# Patient Record
Sex: MALE | Race: BLACK OR AFRICAN AMERICAN | Employment: STUDENT | URBAN - METROPOLITAN AREA
[De-identification: names, ages, dates, MRNs, and addresses within clinical notes are randomized per-mention and may not be internally consistent; named-entity substitution may affect disease eponyms.]

---
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
## 2023-02-15 PROBLEM — F31.60 BIPOLAR DISORDER, MIXED (MULTI): Status: ACTIVE | Noted: 2023-02-15

## 2023-02-15 PROBLEM — F25.9 SCHIZOAFFECTIVE DISORDER (MULTI): Status: ACTIVE | Noted: 2023-02-15

## 2023-02-15 PROBLEM — R74.01 ELEVATED AST (SGOT): Status: ACTIVE | Noted: 2023-02-15

## 2023-02-15 PROBLEM — F41.8 DEPRESSION WITH ANXIETY: Status: ACTIVE | Noted: 2023-02-15

## 2023-02-15 PROBLEM — T88.7XXA MEDICATION SIDE EFFECT: Status: ACTIVE | Noted: 2023-02-15

## 2023-02-15 PROBLEM — S39.012A ACUTE MYOFASCIAL STRAIN OF LUMBOSACRAL REGION: Status: ACTIVE | Noted: 2023-02-15

## 2023-02-15 PROBLEM — L83 ACANTHOSIS NIGRICANS: Status: ACTIVE | Noted: 2023-02-15

## 2023-02-15 PROBLEM — S39.012A STRAIN OF LUMBAR REGION: Status: ACTIVE | Noted: 2023-02-15

## 2023-02-15 PROBLEM — F32.1 CURRENT MODERATE EPISODE OF MAJOR DEPRESSIVE DISORDER WITHOUT PRIOR EPISODE (MULTI): Status: ACTIVE | Noted: 2023-02-15

## 2023-02-15 PROBLEM — J06.9 ACUTE URI: Status: ACTIVE | Noted: 2023-02-15

## 2023-02-15 PROBLEM — F90.9 ADHD: Status: ACTIVE | Noted: 2023-02-15

## 2023-02-15 RX ORDER — AMMONIUM LACTATE 12 G/100G
LOTION TOPICAL
COMMUNITY
Start: 2021-11-04

## 2023-02-15 RX ORDER — DEXTROAMPHETAMINE SACCHARATE, AMPHETAMINE ASPARTATE MONOHYDRATE, DEXTROAMPHETAMINE SULFATE, AMPHETAMINE SULFATE 6.25; 6.25; 6.25; 6.25 MG/1; MG/1; MG/1; MG/1
1 CAPSULE, EXTENDED RELEASE ORAL DAILY
COMMUNITY
Start: 2021-03-01 | End: 2023-03-14 | Stop reason: SDUPTHER

## 2023-02-15 RX ORDER — OLANZAPINE AND FLUOXETINE 6; 25 MG/1; MG/1
1 CAPSULE ORAL EVERY EVENING
COMMUNITY
Start: 2021-07-02 | End: 2023-03-14 | Stop reason: SDUPTHER

## 2023-03-14 DIAGNOSIS — F90.9 ATTENTION DEFICIT HYPERACTIVITY DISORDER (ADHD), UNSPECIFIED ADHD TYPE: Primary | ICD-10-CM

## 2023-03-14 DIAGNOSIS — F31.60 BIPOLAR DISORDER, MIXED (MULTI): ICD-10-CM

## 2023-03-14 RX ORDER — DEXTROAMPHETAMINE SACCHARATE, AMPHETAMINE ASPARTATE MONOHYDRATE, DEXTROAMPHETAMINE SULFATE, AMPHETAMINE SULFATE 6.25; 6.25; 6.25; 6.25 MG/1; MG/1; MG/1; MG/1
25 CAPSULE, EXTENDED RELEASE ORAL DAILY
Qty: 30 CAPSULE | Refills: 0 | Status: SHIPPED | OUTPATIENT
Start: 2023-03-14 | End: 2023-04-13 | Stop reason: SDUPTHER

## 2023-03-14 RX ORDER — OLANZAPINE AND FLUOXETINE 6; 25 MG/1; MG/1
1 CAPSULE ORAL EVERY EVENING
Qty: 30 CAPSULE | Refills: 1 | Status: SHIPPED | OUTPATIENT
Start: 2023-03-14 | End: 2023-05-15

## 2023-03-14 NOTE — TELEPHONE ENCOUNTER
Patient called in requesting refills on 2 medications to be sent to [facility] pharmacy on [address]  1) mydayis ; last fill 2/6  2) olanzapine fluoxetine 6-25 mg  Lov: 12/28  Future prior scheduled appt: 3/29  Please advise.

## 2023-03-29 ENCOUNTER — OFFICE VISIT (OUTPATIENT)
Dept: PRIMARY CARE | Facility: CLINIC | Age: 23
End: 2023-03-29
Payer: COMMERCIAL

## 2023-03-29 VITALS
HEART RATE: 80 BPM | WEIGHT: 254.6 LBS | DIASTOLIC BLOOD PRESSURE: 86 MMHG | HEIGHT: 67 IN | BODY MASS INDEX: 39.96 KG/M2 | TEMPERATURE: 32 F | RESPIRATION RATE: 16 BRPM | SYSTOLIC BLOOD PRESSURE: 126 MMHG | OXYGEN SATURATION: 94 %

## 2023-03-29 DIAGNOSIS — F90.9 ATTENTION DEFICIT HYPERACTIVITY DISORDER (ADHD), UNSPECIFIED ADHD TYPE: Primary | ICD-10-CM

## 2023-03-29 DIAGNOSIS — F31.60 BIPOLAR DISORDER, MIXED (MULTI): ICD-10-CM

## 2023-03-29 DIAGNOSIS — F41.8 DEPRESSION WITH ANXIETY: ICD-10-CM

## 2023-03-29 DIAGNOSIS — E66.09 CLASS 2 OBESITY DUE TO EXCESS CALORIES WITHOUT SERIOUS COMORBIDITY WITH BODY MASS INDEX (BMI) OF 39.0 TO 39.9 IN ADULT: ICD-10-CM

## 2023-03-29 DIAGNOSIS — F25.0 SCHIZOAFFECTIVE DISORDER, BIPOLAR TYPE (MULTI): ICD-10-CM

## 2023-03-29 PROBLEM — E66.812 CLASS 2 OBESITY DUE TO EXCESS CALORIES WITHOUT SERIOUS COMORBIDITY WITH BODY MASS INDEX (BMI) OF 39.0 TO 39.9 IN ADULT: Status: ACTIVE | Noted: 2023-03-29

## 2023-03-29 PROCEDURE — 99213 OFFICE O/P EST LOW 20 MIN: CPT | Performed by: FAMILY MEDICINE

## 2023-03-29 PROCEDURE — 3008F BODY MASS INDEX DOCD: CPT | Performed by: FAMILY MEDICINE

## 2023-03-29 PROCEDURE — 1036F TOBACCO NON-USER: CPT | Performed by: FAMILY MEDICINE

## 2023-03-29 ASSESSMENT — PATIENT HEALTH QUESTIONNAIRE - PHQ9
7. TROUBLE CONCENTRATING ON THINGS, SUCH AS READING THE NEWSPAPER OR WATCHING TELEVISION: SEVERAL DAYS
1. LITTLE INTEREST OR PLEASURE IN DOING THINGS: MORE THAN HALF THE DAYS
4. FEELING TIRED OR HAVING LITTLE ENERGY: SEVERAL DAYS
3. TROUBLE FALLING OR STAYING ASLEEP: NEARLY EVERY DAY
5. POOR APPETITE OR OVEREATING: NOT AT ALL
SUM OF ALL RESPONSES TO PHQ QUESTIONS 1-9: 13
8. MOVING OR SPEAKING SO SLOWLY THAT OTHER PEOPLE COULD HAVE NOTICED. OR THE OPPOSITE, BEING SO FIGETY OR RESTLESS THAT YOU HAVE BEEN MOVING AROUND A LOT MORE THAN USUAL: SEVERAL DAYS
9. THOUGHTS THAT YOU WOULD BE BETTER OFF DEAD, OR OF HURTING YOURSELF: SEVERAL DAYS
SUM OF ALL RESPONSES TO PHQ9 QUESTIONS 1 & 2: 4
6. FEELING BAD ABOUT YOURSELF - OR THAT YOU ARE A FAILURE OR HAVE LET YOURSELF OR YOUR FAMILY DOWN: MORE THAN HALF THE DAYS
2. FEELING DOWN, DEPRESSED OR HOPELESS: MORE THAN HALF THE DAYS
10. IF YOU CHECKED OFF ANY PROBLEMS, HOW DIFFICULT HAVE THESE PROBLEMS MADE IT FOR YOU TO DO YOUR WORK, TAKE CARE OF THINGS AT HOME, OR GET ALONG WITH OTHER PEOPLE: SOMEWHAT DIFFICULT

## 2023-03-29 ASSESSMENT — LIFESTYLE VARIABLES
SKIP TO QUESTIONS 9-10: 1
HOW OFTEN DO YOU HAVE A DRINK CONTAINING ALCOHOL: NEVER
HOW MANY STANDARD DRINKS CONTAINING ALCOHOL DO YOU HAVE ON A TYPICAL DAY: PATIENT DOES NOT DRINK
HOW OFTEN DO YOU HAVE SIX OR MORE DRINKS ON ONE OCCASION: NEVER
AUDIT-C TOTAL SCORE: 0

## 2023-03-29 ASSESSMENT — PAIN SCALES - GENERAL: PAINLEVEL: 0-NO PAIN

## 2023-03-29 NOTE — PATIENT INSTRUCTIONS
Patient to continue current medications (with any exceptions as noted) and diet. Follow-up in 3 month(s) otherwise as needed.

## 2023-03-29 NOTE — PROGRESS NOTES
"Subjective   Patient ID: Go Valdes II is a 22 y.o. male who presents for ADHD and bipolar disorder. I last saw the patient on 12/28/2022.     HPI   Patient states that everything has been okay since his last appointment.     Review of Systems  Except positives as noted in the CC & HPI      Constitutional: Denies fevers, chills, night sweats, fatigue, weight changes, change in appetite    Eyes: Denies blurry vision, double vision    ENT: Denies otalgia, trouble hearing, tinnitus, vertigo, nasal congestion, rhinorrhea, sore throat    Neck: Denies swelling, masses    Cardiovascular: Denies chest pain, palpitations, edema, orthopnea, syncope    Respiratory: Denies dyspnea, cough, wheezing, postural nocturnal dyspnea    Gastrointestinal: Denies abdominal pain, nausea, vomiting, diarrhea, constipation, melena, hematochezia    Genitourinary: Denies dysuria, hematuria, frequency, urgency    Musculoskeletal: Denies back pain, neck pain, arthralgias, myalgias    Integumentary: Denies skin lesions, rashes, masses    Neurological: Denies dizziness, headaches, confusion, limb weakness, paresthesias, syncope, convulsions    Psychiatric: Denies depression, anxiety, homicidal ideations, suicidal ideations, sleep disturbances    Endocrine: Denies polyphagia, polydipsia, polyuria, weakness, hair thinning, heat intolerance, cold intolerance, weight changes    Heme/Lymph: Denies easy bruising, easy bleeding, swollen glands     Objective   /86 (BP Location: Right arm, Patient Position: Sitting)   Pulse 80   Temp (!) 0 °C (32 °F) (Temporal)   Resp 16   Ht 1.702 m (5' 7\")   Wt 115 kg (254 lb 9.6 oz)   SpO2 94%   BMI 39.88 kg/m²     Physical Exam  126/86 on recheck of BP in the right arm.     General Appearance - well-developed, well-nourished, 22 y.o., Black male in no acute distress.     Skin - warm, pink and dry without rash or concerning lesions.     Mental Status - alert and oriented x 3. Normal mood and affect " appropriate to mood.     Neck - supple without lymphadenopathy. Carotid pulses are normal without bruits. Thyroid is normal in midline without nodules.     Chest - lungs are clear to auscultation without rales, rhonchi or wheezes.     Heart - regular, rate and rhythm without murmurs, rubs or gallops.    Abdomen - soft, obese, protuberant, nontender, nondistended. No masses, hepatomegaly or splenomegaly is noted. No rebound, rigidity or guarding is noted. Bowel sounds are normoactive.    Extremities - no cyanosis, clubbing or edema. Pedal pulses are 2+ normal at the dorsalis pedis and posterior pulses bilaterally.     Neurological - cranial nerves II through XII are grossly intact. Motor strength 5/5 at all fours.     Assessment/Plan   1. Attention deficit hyperactivity disorder (ADHD), unspecified ADHD type        2. Bipolar disorder, mixed (CMS/HCC)        3. Depression with anxiety        4. Schizoaffective disorder, bipolar type (CMS/HCC)        5. Class 2 obesity due to excess calories without serious comorbidity with body mass index (BMI) of 39.0 to 39.9 in adult        Patient to continue current medications (with any exceptions as noted) and diet. Follow-up in 3 month(s) otherwise as needed.      Encouraged patient to watch his diet and get more exercise into his routine.       Scribe Attestation  By signing my name below, ILaura Scribe   attest that this documentation has been prepared under the direction and in the presence of Medhat Owens MD.

## 2023-04-13 DIAGNOSIS — F90.9 ATTENTION DEFICIT HYPERACTIVITY DISORDER (ADHD), UNSPECIFIED ADHD TYPE: Primary | ICD-10-CM

## 2023-04-13 RX ORDER — DEXTROAMPHETAMINE SACCHARATE, AMPHETAMINE ASPARTATE MONOHYDRATE, DEXTROAMPHETAMINE SULFATE, AMPHETAMINE SULFATE 6.25; 6.25; 6.25; 6.25 MG/1; MG/1; MG/1; MG/1
25 CAPSULE, EXTENDED RELEASE ORAL DAILY
Qty: 30 CAPSULE | Refills: 0 | Status: SHIPPED | OUTPATIENT
Start: 2023-04-13 | End: 2023-05-18 | Stop reason: SDUPTHER

## 2023-04-13 NOTE — TELEPHONE ENCOUNTER
Rx Refill Request Telephone Encounter    Name:  Go Valdes II  :  [id number]  Medication Name:  Mydayis 25 mg  Specific Pharmacy location:  [facility]  Date of last appointment:  3/29  Date of next appointment:    Best number to reach patient:  [phone]

## 2023-05-13 DIAGNOSIS — F31.60 BIPOLAR DISORDER, MIXED (MULTI): Primary | ICD-10-CM

## 2023-05-15 RX ORDER — OLANZAPINE AND FLUOXETINE 6; 25 MG/1; MG/1
1 CAPSULE ORAL EVERY EVENING
Qty: 30 CAPSULE | Refills: 1 | Status: SHIPPED | OUTPATIENT
Start: 2023-05-15 | End: 2023-06-16 | Stop reason: SDUPTHER

## 2023-05-18 DIAGNOSIS — F90.9 ATTENTION DEFICIT HYPERACTIVITY DISORDER (ADHD), UNSPECIFIED ADHD TYPE: ICD-10-CM

## 2023-05-18 RX ORDER — DEXTROAMPHETAMINE SACCHARATE, AMPHETAMINE ASPARTATE MONOHYDRATE, DEXTROAMPHETAMINE SULFATE, AMPHETAMINE SULFATE 6.25; 6.25; 6.25; 6.25 MG/1; MG/1; MG/1; MG/1
25 CAPSULE, EXTENDED RELEASE ORAL DAILY
Qty: 30 CAPSULE | Refills: 0 | Status: SHIPPED | OUTPATIENT
Start: 2023-05-18 | End: 2023-06-16 | Stop reason: SDUPTHER

## 2023-05-18 NOTE — TELEPHONE ENCOUNTER
Rx Refill Request Telephone Encounter    Name:  Go aVldes II  :  [id number]  Medication Name:  amphetamine-dextroamphetamine XR (Mydayis) 25 mg   Specific Pharmacy location:  [facility] [address]  Date of last appointment:  3/29  Date of next appointment:    Best number to reach patient:  [phone]

## 2023-06-16 DIAGNOSIS — F31.60 BIPOLAR DISORDER, MIXED (MULTI): ICD-10-CM

## 2023-06-16 DIAGNOSIS — F90.9 ATTENTION DEFICIT HYPERACTIVITY DISORDER (ADHD), UNSPECIFIED ADHD TYPE: ICD-10-CM

## 2023-06-16 RX ORDER — OLANZAPINE AND FLUOXETINE 6; 25 MG/1; MG/1
1 CAPSULE ORAL EVERY EVENING
Qty: 30 CAPSULE | Refills: 1 | Status: SHIPPED | OUTPATIENT
Start: 2023-06-16 | End: 2023-07-18 | Stop reason: SDUPTHER

## 2023-06-16 RX ORDER — DEXTROAMPHETAMINE SACCHARATE, AMPHETAMINE ASPARTATE MONOHYDRATE, DEXTROAMPHETAMINE SULFATE, AMPHETAMINE SULFATE 6.25; 6.25; 6.25; 6.25 MG/1; MG/1; MG/1; MG/1
25 CAPSULE, EXTENDED RELEASE ORAL DAILY
Qty: 30 CAPSULE | Refills: 0 | Status: SHIPPED | OUTPATIENT
Start: 2023-06-16 | End: 2023-07-18 | Stop reason: SDUPTHER

## 2023-06-16 NOTE — TELEPHONE ENCOUNTER
Rx Refill Request Telephone Encounter    Name:  Go Valdes II  :  [id number]  Medication Name:  OLANZAPINE-FLUOXETINE 6-25 MG            Specific Pharmacy location:  [facility]  Date of last appointment:  3/29/23  Date of next appointment:  23  Best number to reach patient:  [phone]      Name:  Go Valdes II  :  [id number]  Medication Name:  MYDAYIS 25 MG            Specific Pharmacy location:  [facility]  Date of last appointment:  3/29/23  Date of next appointment:  23  Best number to reach patient:  [phone]

## 2023-06-27 NOTE — PROGRESS NOTES
Subjective   Patient ID: Go Valdes II is a 23 y.o. male who presents for Depression, Anxiety, Schizoaffective Disorder, Bipolar, and Follow-up.  I last saw the patient on 3/29/2023.     HPI   Patient states that he would like an increase on his Mydayis. He states that it loses its effect as the say goes on. Patient states that you have previously discussed him taking one in the morning and one later on in the day and patient would like to try this.     He notes that there was a plateau with two peaks effect with the Adderall. With Mydayis, there is a plateau with a slowly lessening effect. He prefers the Mydayis.     He mentions that he has been staying consistent and exercising.     Review of Systems  Except positives as noted in the CC & HPI      Constitutional: Denies fevers, chills, night sweats, fatigue, weight changes, change in appetite    Eyes: Denies blurry vision, double vision    ENT: Denies otalgia, trouble hearing, tinnitus, vertigo, nasal congestion, rhinorrhea, sore throat    Neck: Denies swelling, masses    Cardiovascular: Denies chest pain, palpitations, edema, orthopnea, syncope    Respiratory: Denies dyspnea, cough, wheezing, postural nocturnal dyspnea    Gastrointestinal: Denies abdominal pain, nausea, vomiting, diarrhea, constipation, melena, hematochezia    Genitourinary: Denies dysuria, hematuria, frequency, urgency    Musculoskeletal: Denies back pain, neck pain, arthralgias, myalgias    Integumentary: Denies skin lesions, rashes, masses    Neurological: Denies dizziness, headaches, confusion, limb weakness, paresthesias, syncope, convulsions    Psychiatric: Denies depression, anxiety, homicidal ideations, suicidal ideations, sleep disturbances    Endocrine: Denies polyphagia, polydipsia, polyuria, weakness, hair thinning, heat intolerance, cold intolerance, weight changes    Heme/Lymph: Denies easy bruising, easy bleeding, swollen glands     Objective   /74 (BP Location: Right arm,  "Patient Position: Sitting)   Pulse 88   Temp 36 °C (96.8 °F)   Resp 16   Ht 1.702 m (5' 7\")   Wt 116 kg (255 lb 6.4 oz)   SpO2 96%   BMI 40.00 kg/m²     Physical Exam  108/74 on recheck of BP in the right arm.     General Appearance - well-developed, well-nourished, 23 y.o., Black male in no acute distress.     Skin - warm, pink and dry without rash or concerning lesions.     Mental Status - alert and oriented x 3. Normal mood and affect appropriate to mood.     Neck - supple without lymphadenopathy. Carotid pulses are normal without bruits. Thyroid is normal in midline without nodules.     Chest - lungs are clear to auscultation without rales, rhonchi or wheezes.     Heart - regular, rate and rhythm without murmurs, rubs or gallops.    Extremities - no cyanosis, clubbing or edema. Pedal pulses are 2+ normal at the dorsalis pedis and posterior pulses bilaterally.     Neurological - cranial nerves II through XII are grossly intact. Motor strength 5/5 at all fours.     Assessment/Plan   1. Attention deficit hyperactivity disorder (ADHD), unspecified ADHD type        2. Bipolar disorder, mixed (CMS/HCC)        3. Depression with anxiety        4. Class 3 severe obesity due to excess calories without serious comorbidity with body mass index (BMI) of 40.0 to 44.9 in adult (CMS/HCC)        Patient to continue current medications (with any exceptions as noted) and diet. Follow-up in 3 month(s) otherwise as needed.      Consider increasing Mydayis dosage to 37.5 mg at his next visit.     Encouraged patient to continue watching his diet and exercise routine.         Scribe Attestation  By signing my name below, ILaura Scribe   attest that this documentation has been prepared under the direction and in the presence of Medhat Owens MD.   "

## 2023-06-28 ENCOUNTER — OFFICE VISIT (OUTPATIENT)
Dept: PRIMARY CARE | Facility: CLINIC | Age: 23
End: 2023-06-28
Payer: COMMERCIAL

## 2023-06-28 VITALS
OXYGEN SATURATION: 96 % | RESPIRATION RATE: 16 BRPM | HEART RATE: 88 BPM | HEIGHT: 67 IN | SYSTOLIC BLOOD PRESSURE: 108 MMHG | TEMPERATURE: 96.8 F | WEIGHT: 255.4 LBS | BODY MASS INDEX: 40.09 KG/M2 | DIASTOLIC BLOOD PRESSURE: 74 MMHG

## 2023-06-28 DIAGNOSIS — F41.8 DEPRESSION WITH ANXIETY: ICD-10-CM

## 2023-06-28 DIAGNOSIS — F31.60 BIPOLAR DISORDER, MIXED (MULTI): ICD-10-CM

## 2023-06-28 DIAGNOSIS — E66.01 CLASS 3 SEVERE OBESITY DUE TO EXCESS CALORIES WITHOUT SERIOUS COMORBIDITY WITH BODY MASS INDEX (BMI) OF 40.0 TO 44.9 IN ADULT (MULTI): ICD-10-CM

## 2023-06-28 DIAGNOSIS — F90.9 ATTENTION DEFICIT HYPERACTIVITY DISORDER (ADHD), UNSPECIFIED ADHD TYPE: Primary | ICD-10-CM

## 2023-06-28 PROBLEM — J06.9 ACUTE URI: Status: RESOLVED | Noted: 2023-02-15 | Resolved: 2023-06-28

## 2023-06-28 PROBLEM — E66.813 CLASS 3 SEVERE OBESITY DUE TO EXCESS CALORIES WITHOUT SERIOUS COMORBIDITY WITH BODY MASS INDEX (BMI) OF 40.0 TO 44.9 IN ADULT: Status: ACTIVE | Noted: 2023-03-29

## 2023-06-28 PROCEDURE — 99213 OFFICE O/P EST LOW 20 MIN: CPT | Performed by: FAMILY MEDICINE

## 2023-06-28 PROCEDURE — 1036F TOBACCO NON-USER: CPT | Performed by: FAMILY MEDICINE

## 2023-06-28 PROCEDURE — 3008F BODY MASS INDEX DOCD: CPT | Performed by: FAMILY MEDICINE

## 2023-06-28 ASSESSMENT — LIFESTYLE VARIABLES
HOW OFTEN DO YOU HAVE A DRINK CONTAINING ALCOHOL: NEVER
SKIP TO QUESTIONS 9-10: 1
HOW OFTEN DO YOU HAVE SIX OR MORE DRINKS ON ONE OCCASION: NEVER
HOW MANY STANDARD DRINKS CONTAINING ALCOHOL DO YOU HAVE ON A TYPICAL DAY: PATIENT DOES NOT DRINK
AUDIT-C TOTAL SCORE: 0

## 2023-06-28 ASSESSMENT — PATIENT HEALTH QUESTIONNAIRE - PHQ9
SUM OF ALL RESPONSES TO PHQ9 QUESTIONS 1 & 2: 2
1. LITTLE INTEREST OR PLEASURE IN DOING THINGS: SEVERAL DAYS
2. FEELING DOWN, DEPRESSED OR HOPELESS: SEVERAL DAYS
10. IF YOU CHECKED OFF ANY PROBLEMS, HOW DIFFICULT HAVE THESE PROBLEMS MADE IT FOR YOU TO DO YOUR WORK, TAKE CARE OF THINGS AT HOME, OR GET ALONG WITH OTHER PEOPLE: SOMEWHAT DIFFICULT

## 2023-06-28 NOTE — PATIENT INSTRUCTIONS
Patient to continue current medications (with any exceptions as noted) and diet. Follow-up in 3 month(s) otherwise as needed.      Consider increasing Mydayis dosage at his next visit.     Encouraged patient to continue watching his diet and exercise routine.

## 2023-07-18 DIAGNOSIS — F90.9 ATTENTION DEFICIT HYPERACTIVITY DISORDER (ADHD), UNSPECIFIED ADHD TYPE: ICD-10-CM

## 2023-07-18 DIAGNOSIS — F31.60 BIPOLAR DISORDER, MIXED (MULTI): Primary | ICD-10-CM

## 2023-07-18 RX ORDER — OLANZAPINE AND FLUOXETINE 6; 25 MG/1; MG/1
1 CAPSULE ORAL EVERY EVENING
Qty: 30 CAPSULE | Refills: 1 | Status: SHIPPED | OUTPATIENT
Start: 2023-07-18 | End: 2023-09-21 | Stop reason: SDUPTHER

## 2023-07-18 RX ORDER — DEXTROAMPHETAMINE SACCHARATE, AMPHETAMINE ASPARTATE MONOHYDRATE, DEXTROAMPHETAMINE SULFATE, AMPHETAMINE SULFATE 6.25; 6.25; 6.25; 6.25 MG/1; MG/1; MG/1; MG/1
25 CAPSULE, EXTENDED RELEASE ORAL DAILY
Qty: 30 CAPSULE | Refills: 0 | Status: SHIPPED | OUTPATIENT
Start: 2023-07-18 | End: 2023-08-25 | Stop reason: SDUPTHER

## 2023-07-18 NOTE — TELEPHONE ENCOUNTER
Rx Refill Request Telephone Encounter    Name:  Go Valdes II  :  [id number]  Medication Name:    olanzapine-fluoxetine (Symbyax) 6-25 mg   amphetamine-dextroamphetamine XR (Mydayis) 25 mg **  Specific Pharmacy location:  [facility]  Date of last appointment:    Date of next appointment:    Best number to reach patient:  [phone]       ** patient noted the dosage of the mydayis was supposed to be increased after his appointment on

## 2023-07-19 DIAGNOSIS — F31.60 BIPOLAR DISORDER, MIXED (MULTI): ICD-10-CM

## 2023-07-21 DIAGNOSIS — F31.60 BIPOLAR DISORDER, MIXED (MULTI): ICD-10-CM

## 2023-07-24 RX ORDER — OLANZAPINE AND FLUOXETINE 6; 25 MG/1; MG/1
1 CAPSULE ORAL EVERY EVENING
Qty: 30 CAPSULE | Refills: 1 | OUTPATIENT
Start: 2023-07-24

## 2023-07-24 NOTE — TELEPHONE ENCOUNTER
We received a request for prior authorization on the patient's SYMBYAX from their pharmacy. Prior authorization was submitted to insurance today. We will await their determination.

## 2023-07-25 DIAGNOSIS — F31.60 BIPOLAR DISORDER, MIXED (MULTI): ICD-10-CM

## 2023-07-28 RX ORDER — OLANZAPINE AND FLUOXETINE 6; 25 MG/1; MG/1
1 CAPSULE ORAL EVERY EVENING
Qty: 30 CAPSULE | Refills: 1 | OUTPATIENT
Start: 2023-07-28

## 2023-08-25 DIAGNOSIS — F90.9 ATTENTION DEFICIT HYPERACTIVITY DISORDER (ADHD), UNSPECIFIED ADHD TYPE: ICD-10-CM

## 2023-08-25 RX ORDER — DEXTROAMPHETAMINE SACCHARATE, AMPHETAMINE ASPARTATE MONOHYDRATE, DEXTROAMPHETAMINE SULFATE, AMPHETAMINE SULFATE 9.375; 9.375; 9.375; 9.375 MG/1; MG/1; MG/1; MG/1
37.5 CAPSULE, EXTENDED RELEASE ORAL DAILY
Qty: 30 CAPSULE | Refills: 0 | Status: SHIPPED | OUTPATIENT
Start: 2023-08-25 | End: 2023-10-09 | Stop reason: SDUPTHER

## 2023-08-25 RX ORDER — DEXTROAMPHETAMINE SACCHARATE, AMPHETAMINE ASPARTATE MONOHYDRATE, DEXTROAMPHETAMINE SULFATE, AMPHETAMINE SULFATE 6.25; 6.25; 6.25; 6.25 MG/1; MG/1; MG/1; MG/1
25 CAPSULE, EXTENDED RELEASE ORAL DAILY
Qty: 30 CAPSULE | Refills: 0 | Status: SHIPPED | OUTPATIENT
Start: 2023-08-25 | End: 2023-08-25 | Stop reason: SDUPTHER

## 2023-08-25 NOTE — TELEPHONE ENCOUNTER
Rx Refill Request Telephone Encounter    Name:  Go Valdes II  :  [id number]  Medication Name:  mydayis - patient stated he was supposed to have a dose increase   Specific Pharmacy location:  [facility] [address]    Date of last appointment:  23  Date of next appointment:  23  Best number to reach patient:  [phone]

## 2023-08-29 RX ORDER — DEXTROAMPHETAMINE SACCHARATE, AMPHETAMINE ASPARTATE MONOHYDRATE, DEXTROAMPHETAMINE SULFATE, AMPHETAMINE SULFATE 9.375; 9.375; 9.375; 9.375 MG/1; MG/1; MG/1; MG/1
37.5 CAPSULE, EXTENDED RELEASE ORAL DAILY
Qty: 30 CAPSULE | Refills: 0 | OUTPATIENT
Start: 2023-08-29 | End: 2023-09-28

## 2023-08-29 NOTE — TELEPHONE ENCOUNTER
Rx Refill Request Telephone Encounter    Name:  Go Valdes II  :  [id number]  Medication Name:  Mydayis 37.5mg 24hr capsule  Specific Pharmacy location:  [facility] [id number]   Date of last appointment:  23  Date of next appointment:  23  Best number to reach patient:  [phone]

## 2023-09-01 ENCOUNTER — TELEPHONE (OUTPATIENT)
Dept: PRIMARY CARE | Facility: CLINIC | Age: 23
End: 2023-09-01
Payer: COMMERCIAL

## 2023-09-01 NOTE — TELEPHONE ENCOUNTER
Patient called in stating he is needing a PA on his prescription for mydayis. He is asking this be started ASAP  Please Advise

## 2023-09-21 DIAGNOSIS — F31.60 BIPOLAR DISORDER, MIXED (MULTI): ICD-10-CM

## 2023-09-21 RX ORDER — OLANZAPINE AND FLUOXETINE 6; 25 MG/1; MG/1
1 CAPSULE ORAL EVERY EVENING
Qty: 30 CAPSULE | Refills: 0 | Status: SHIPPED | OUTPATIENT
Start: 2023-09-21 | End: 2023-10-23 | Stop reason: SDUPTHER

## 2023-09-21 NOTE — TELEPHONE ENCOUNTER
Rx Refill Request Telephone Encounter    Name:  Go Valdes II  :  [id number]  Medication Name:  symbyax 6-25 mg    Specific Pharmacy location:  [facility] [address]  Date of last appointment:  23  Date of next appointment:  23  Best number to reach patient:  [phone]

## 2023-09-27 ENCOUNTER — OFFICE VISIT (OUTPATIENT)
Dept: PRIMARY CARE | Facility: CLINIC | Age: 23
End: 2023-09-27
Payer: COMMERCIAL

## 2023-09-27 VITALS
TEMPERATURE: 97.2 F | RESPIRATION RATE: 16 BRPM | DIASTOLIC BLOOD PRESSURE: 88 MMHG | HEIGHT: 67 IN | WEIGHT: 250 LBS | HEART RATE: 75 BPM | BODY MASS INDEX: 39.24 KG/M2 | SYSTOLIC BLOOD PRESSURE: 118 MMHG | OXYGEN SATURATION: 100 %

## 2023-09-27 DIAGNOSIS — Z51.81 ENCOUNTER FOR THERAPEUTIC DRUG LEVEL MONITORING: ICD-10-CM

## 2023-09-27 DIAGNOSIS — F31.60 BIPOLAR DISORDER, MIXED (MULTI): ICD-10-CM

## 2023-09-27 DIAGNOSIS — F41.8 DEPRESSION WITH ANXIETY: ICD-10-CM

## 2023-09-27 DIAGNOSIS — E66.09 CLASS 2 OBESITY DUE TO EXCESS CALORIES WITHOUT SERIOUS COMORBIDITY WITH BODY MASS INDEX (BMI) OF 39.0 TO 39.9 IN ADULT: ICD-10-CM

## 2023-09-27 DIAGNOSIS — F90.9 ATTENTION DEFICIT HYPERACTIVITY DISORDER (ADHD), UNSPECIFIED ADHD TYPE: Primary | ICD-10-CM

## 2023-09-27 DIAGNOSIS — Z13.220 LIPID SCREENING: ICD-10-CM

## 2023-09-27 PROCEDURE — 99213 OFFICE O/P EST LOW 20 MIN: CPT | Performed by: FAMILY MEDICINE

## 2023-09-27 PROCEDURE — 3008F BODY MASS INDEX DOCD: CPT | Performed by: FAMILY MEDICINE

## 2023-09-27 PROCEDURE — 80307 DRUG TEST PRSMV CHEM ANLYZR: CPT

## 2023-09-27 PROCEDURE — 1036F TOBACCO NON-USER: CPT | Performed by: FAMILY MEDICINE

## 2023-09-27 ASSESSMENT — PATIENT HEALTH QUESTIONNAIRE - PHQ9
1. LITTLE INTEREST OR PLEASURE IN DOING THINGS: SEVERAL DAYS
10. IF YOU CHECKED OFF ANY PROBLEMS, HOW DIFFICULT HAVE THESE PROBLEMS MADE IT FOR YOU TO DO YOUR WORK, TAKE CARE OF THINGS AT HOME, OR GET ALONG WITH OTHER PEOPLE: VERY DIFFICULT
SUM OF ALL RESPONSES TO PHQ9 QUESTIONS 1 & 2: 2
2. FEELING DOWN, DEPRESSED OR HOPELESS: SEVERAL DAYS

## 2023-09-27 NOTE — PATIENT INSTRUCTIONS
Patient to continue current medications (with any exceptions as noted) and diet. Follow-up in 3 month(s) otherwise as needed.      Patient is to return for fasting CBC, CMP, lipid panel labs at their convenience prior to his next appointment. Fasting is nothing to eat or drink except water or black coffee for 8-12 hours. Will call patient with results when available.     Encouraged patient to continue with exercise and losing weight.     Patient declines flu vaccine.

## 2023-09-27 NOTE — PROGRESS NOTES
"Subjective   Patient ID: Go Valdes II is a 23 y.o. male who presents for Follow-up, ADHD, and Depression. I last saw the patient on 6/28/2023.     HPI   Patient has no medical complaints today. Patient notes he is doing better with the increased dose of Mydayis.     He mentions that he has started going back to the gym.     Review of Systems  Except positives as noted in the CC & HPI      Constitutional: Denies fevers, chills, night sweats, fatigue, weight changes, change in appetite    Eyes: Denies blurry vision, double vision    ENT: Denies otalgia, trouble hearing, tinnitus, vertigo, nasal congestion, rhinorrhea, sore throat    Neck: Denies swelling, masses    Cardiovascular: Denies chest pain, palpitations, edema, orthopnea, syncope    Respiratory: Denies dyspnea, cough, wheezing, postural nocturnal dyspnea    Gastrointestinal: Denies abdominal pain, nausea, vomiting, diarrhea, constipation, melena, hematochezia    Genitourinary: Denies dysuria, hematuria, frequency, urgency    Musculoskeletal: Denies back pain, neck pain, arthralgias, myalgias    Integumentary: Denies skin lesions, rashes, masses    Neurological: Denies dizziness, headaches, confusion, limb weakness, paresthesias, syncope, convulsions    Psychiatric: Denies depression, anxiety, homicidal ideations, suicidal ideations, sleep disturbances    Endocrine: Denies polyphagia, polydipsia, polyuria, weakness, hair thinning, heat intolerance, cold intolerance, weight changes    Heme/Lymph: Denies easy bruising, easy bleeding, swollen glands    Objective   /88 (BP Location: Right arm, Patient Position: Sitting)   Pulse 75   Temp 36.2 °C (97.2 °F)   Resp 16   Ht 1.702 m (5' 7\")   Wt 113 kg (250 lb)   SpO2 100%   BMI 39.16 kg/m²     Physical Exam  118/88 on recheck of BP in the right arm.     General Appearance - well-developed, well-nourished, 23 y.o., Black male in no acute distress.     Skin - warm, pink and dry without rash or concerning " lesions.     Mental Status - alert and oriented x 3. Normal mood and affect appropriate to mood.     Neck - supple without lymphadenopathy. Carotid pulses are normal without bruits. Thyroid is normal in midline without nodules.     Chest - lungs are clear to auscultation without rales, rhonchi or wheezes.     Heart - regular, rate and rhythm without murmurs, rubs or gallops.    Extremities - no cyanosis, clubbing or edema. Pedal pulses are 2+ normal at the dorsalis pedis and posterior pulses bilaterally.     Neurological - cranial nerves II through XII are grossly intact. Motor strength 5/5 at all fours.     Assessment/Plan   1. Attention deficit hyperactivity disorder (ADHD), unspecified ADHD type  Follow Up In [facility] - PCP - Established    Follow Up In [facility] - PCP - Established      2. Bipolar disorder, mixed (CMS/HCC)  Follow Up In [facility] - PCP - Established    Follow Up In [facility] - PCP - Established      3. Depression with anxiety  Follow Up In [facility] - PCP - Established    Follow Up In [facility] - PCP - Established      4. Class 2 obesity due to excess calories without serious comorbidity with body mass index (BMI) of 39.0 to 39.9 in adult  Follow Up In [facility] - PCP - Established    CBC and Auto Differential    Comprehensive Metabolic Panel      5. Lipid screening  Lipid Panel      6. Encounter for therapeutic drug level monitoring  Drug Screen, Urine With Reflex to Confirmation    Drug Screen, Urine With Reflex to Confirmation      Patient to continue current medications (with any exceptions as noted) and diet. Follow-up in 3 month(s) otherwise as needed.      Patient is to return for fasting CBC, CMP, lipid panel labs at their convenience prior to his next appointment. Fasting is nothing to eat or drink except water or black coffee for 8-12 hours. Will call patient with results when available.     Encouraged  patient to continue with exercise and losing weight.     Patient declines flu vaccine.     Patient signed medication contract and UDS will be obtained today.     I personally have reviewed the OARRS report for this patient. This report is scanned into the electronic medical record. I have considered the risks of abuse, dependence, addiction and diversion. I believe that it is clinically appropriate for the patient to be prescribed this medication.       Scribe Attestation  By signing my name below, I, Bobbi Aguirre   attest that this documentation has been prepared under the direction and in the presence of Medhat Owens MD.

## 2023-09-28 LAB
AMPHETAMINE (PRESENCE) IN URINE BY SCREEN METHOD: ABNORMAL
BARBITURATES PRESENCE IN URINE BY SCREEN METHOD: ABNORMAL
BENZODIAZEPINE (PRESENCE) IN URINE BY SCREEN METHOD: ABNORMAL
CANNABINOIDS IN URINE BY SCREEN METHOD: ABNORMAL
COCAINE (PRESENCE) IN URINE BY SCREEN METHOD: ABNORMAL
DRUG SCREEN COMMENT URINE: ABNORMAL
FENTANYL URINE: ABNORMAL
OPIATES (PRESENCE) IN URINE BY SCREEN METHOD: ABNORMAL
OXYCODONE (PRESENCE) IN URINE BY SCREEN METHOD: ABNORMAL
PHENCYCLIDINE (PRESENCE) IN URINE BY SCREEN METHOD: ABNORMAL

## 2023-10-09 DIAGNOSIS — F90.9 ATTENTION DEFICIT HYPERACTIVITY DISORDER (ADHD), UNSPECIFIED ADHD TYPE: ICD-10-CM

## 2023-10-09 RX ORDER — DEXTROAMPHETAMINE SACCHARATE, AMPHETAMINE ASPARTATE MONOHYDRATE, DEXTROAMPHETAMINE SULFATE, AMPHETAMINE SULFATE 9.375; 9.375; 9.375; 9.375 MG/1; MG/1; MG/1; MG/1
37.5 CAPSULE, EXTENDED RELEASE ORAL DAILY
Qty: 30 CAPSULE | Refills: 0 | Status: SHIPPED | OUTPATIENT
Start: 2023-10-09 | End: 2023-11-13 | Stop reason: SDUPTHER

## 2023-10-09 NOTE — TELEPHONE ENCOUNTER
Rx Refill Request Telephone Encounter    Name:  Go Valdes II  :  [id number]  Medication Name:  mydayis 37.5    Specific Pharmacy location:  [facility] #[id number]  Date of last appointment:  23  Date of next appointment:  23  Best number to reach patient:  [phone]

## 2023-10-23 DIAGNOSIS — F31.60 BIPOLAR DISORDER, MIXED (MULTI): Primary | ICD-10-CM

## 2023-10-23 DIAGNOSIS — F31.60 BIPOLAR DISORDER, MIXED (MULTI): ICD-10-CM

## 2023-10-23 RX ORDER — OLANZAPINE AND FLUOXETINE 6; 25 MG/1; MG/1
1 CAPSULE ORAL EVERY EVENING
Qty: 30 CAPSULE | Refills: 0 | OUTPATIENT
Start: 2023-10-23

## 2023-10-23 RX ORDER — OLANZAPINE AND FLUOXETINE 6; 25 MG/1; MG/1
1 CAPSULE ORAL EVERY EVENING
Qty: 30 CAPSULE | Refills: 2 | Status: SHIPPED | OUTPATIENT
Start: 2023-10-23 | End: 2023-11-20 | Stop reason: SDUPTHER

## 2023-10-23 NOTE — TELEPHONE ENCOUNTER
Rx Refill Request Telephone Encounter    Name:  Go Valdes II  :  [id number]  Medication Name:  symbyax 6-25mg    Specific Pharmacy location:  [facility] [address]  Date of last appointment:  23  Date of next appointment:  23  Best number to reach patient:  [phone]

## 2023-10-24 LAB
AMPHETAMINES,URINE: NORMAL
MDA,URINE: NORMAL
MDEA,URINE: NORMAL
MDMA,UR: NORMAL
METHAMPHETAMINE QUANTITATIVE URINE: NORMAL
PHENTERMINE,UR: NORMAL

## 2023-11-13 DIAGNOSIS — F90.9 ATTENTION DEFICIT HYPERACTIVITY DISORDER (ADHD), UNSPECIFIED ADHD TYPE: Primary | ICD-10-CM

## 2023-11-13 RX ORDER — DEXTROAMPHETAMINE SACCHARATE, AMPHETAMINE ASPARTATE MONOHYDRATE, DEXTROAMPHETAMINE SULFATE, AMPHETAMINE SULFATE 9.375; 9.375; 9.375; 9.375 MG/1; MG/1; MG/1; MG/1
37.5 CAPSULE, EXTENDED RELEASE ORAL DAILY
Qty: 30 CAPSULE | Refills: 0 | Status: SHIPPED | OUTPATIENT
Start: 2023-11-13 | End: 2023-12-20 | Stop reason: SDUPTHER

## 2023-11-13 NOTE — TELEPHONE ENCOUNTER
Rx Refill Request Telephone Encounter    Name:  Go Valdes II  :  [id number]  Medication Name:  mydayis 37.5mg    Specific Pharmacy location:  [facility] [address]  Date of last appointment:  23  Date of next appointment:  23  Best number to reach patient:  [phone]

## 2023-11-20 DIAGNOSIS — F31.60 BIPOLAR DISORDER, MIXED (MULTI): ICD-10-CM

## 2023-11-20 RX ORDER — OLANZAPINE AND FLUOXETINE 6; 25 MG/1; MG/1
1 CAPSULE ORAL EVERY EVENING
Qty: 30 CAPSULE | Refills: 2 | Status: SHIPPED | OUTPATIENT
Start: 2023-11-20 | End: 2024-01-18 | Stop reason: SDUPTHER

## 2023-11-20 NOTE — TELEPHONE ENCOUNTER
Rx Refill Request Telephone Encounter    Name:  Go Valdes II  :  [id number]  Medication Name:  OLANZAPINE - FLUOXETINE 6-25MG   Specific Pharmacy location:  [facility] [address]   Date of last appointment: 23   Date of next appointment:  23  Best number to reach patient:  [phone]

## 2023-12-19 NOTE — PROGRESS NOTES
"Subjective   Patient ID: Go Valdes II is a 23 y.o. male who presents for ADHD, Depression, and Obesity.  I last saw the patient on 9/27/2023.     HPI   Patient has no medical complaints today or refill requests.     Symbyax has been working well for him, emotionally.     Review of Systems  Except positives as noted in the CC & HPI      Constitutional: Denies fevers, chills, night sweats, fatigue, weight changes, change in appetite    Eyes: Denies blurry vision, double vision    ENT: Denies otalgia, trouble hearing, tinnitus, vertigo, nasal congestion, rhinorrhea, sore throat    Neck: Denies swelling, masses    Cardiovascular: Denies chest pain, palpitations, edema, orthopnea, syncope    Respiratory: Denies dyspnea, cough, wheezing, postural nocturnal dyspnea    Gastrointestinal: Denies abdominal pain, nausea, vomiting, diarrhea, constipation, melena, hematochezia    Genitourinary: Denies dysuria, hematuria, frequency, urgency    Musculoskeletal: Denies back pain, neck pain, arthralgias, myalgias    Integumentary: Denies skin lesions, rashes, masses    Neurological: Denies dizziness, headaches, confusion, limb weakness, paresthesias, syncope, convulsions    Psychiatric: Denies depression, anxiety, homicidal ideations, suicidal ideations, sleep disturbances    Endocrine: Denies polyphagia, polydipsia, polyuria, weakness, hair thinning, heat intolerance, cold intolerance, weight changes    Heme/Lymph: Denies easy bruising, easy bleeding, swollen glands    Objective   /82 (BP Location: Right arm, Patient Position: Sitting)   Pulse 80   Temp 36.4 °C (97.6 °F)   Resp 16   Ht 1.702 m (5' 7\")   Wt 117 kg (258 lb 6.4 oz)   SpO2 95%   BMI 40.47 kg/m²     Physical Exam  116/82 on recheck of BP in the right arm. HR recheck was 80 bpm.      General Appearance - well-developed, obese, 23 y.o., Black male in no acute distress.     Skin - warm, pink and dry without rash or concerning lesions.     Mental Status - " alert and oriented x 3. Normal mood and affect appropriate to mood.     Neck - supple without lymphadenopathy. Carotid pulses are normal without bruits. Thyroid is normal in midline without nodules.     Chest - lungs are clear to auscultation without rales, rhonchi or wheezes.     Heart - regular, rate and rhythm without murmurs, rubs or gallops.    Abdomen - soft, obese, protuberant, nontender, nondistended. No masses, hepatomegaly or splenomegaly is noted. No rebound, rigidity or guarding is noted. Bowel sounds are normoactive.     Extremities - no cyanosis, clubbing or edema. Pedal pulses are 2+ normal at the dorsalis pedis and posterior pulses bilaterally.     Neurological - cranial nerves II through XII are grossly intact. Motor strength 5/5 at all fours.     Assessment/Plan   1. Attention deficit hyperactivity disorder (ADHD), unspecified ADHD type  Follow Up In [facility] - PCP - Established    amphetamine-dextroamphetamine XR (Mydayis) 37.5 mg 24 hr capsule    Follow Up In [facility] - PCP - Established      2. Bipolar disorder, mixed (CMS/HCC)  Follow Up In [facility] - PCP - Established    Follow Up In [facility] - PCP - Established      3. Depression with anxiety  Follow Up In [facility] - PCP - Established    Follow Up In [facility] - PCP - Established      4. Class 3 severe obesity due to excess calories without serious comorbidity with body mass index (BMI) of 40.0 to 44.9 in adult (CMS/HCC)  TSH with reflex to Free T4 if abnormal      Patient to continue current medications (with any exceptions as noted) and diet. Follow-up in 3 month(s) otherwise as needed.      Patient is to complete fasting CBC, CMP, lipid panel, TSH labs today. Will call patient with results when available.     Patient was given refill(s) on:   Amphetamine-Dextroamphetamine XR 37.5 mg, TAKE 1 CAPSULE BY MOUTH DAILY     Rx(s) sent to pharmacy.     Encouraged patient  to watch his diet and get more exercise into his routine.       Scribe Attestation  By signing my name below, I, Bobbi Aguirre   attest that this documentation has been prepared under the direction and in the presence of Medhat Owens MD.

## 2023-12-20 ENCOUNTER — OFFICE VISIT (OUTPATIENT)
Dept: PRIMARY CARE | Facility: CLINIC | Age: 23
End: 2023-12-20
Payer: COMMERCIAL

## 2023-12-20 ENCOUNTER — LAB (OUTPATIENT)
Dept: LAB | Facility: LAB | Age: 23
End: 2023-12-20
Payer: COMMERCIAL

## 2023-12-20 VITALS
SYSTOLIC BLOOD PRESSURE: 116 MMHG | RESPIRATION RATE: 16 BRPM | TEMPERATURE: 97.6 F | OXYGEN SATURATION: 95 % | WEIGHT: 258.4 LBS | HEIGHT: 67 IN | DIASTOLIC BLOOD PRESSURE: 82 MMHG | HEART RATE: 80 BPM | BODY MASS INDEX: 40.56 KG/M2

## 2023-12-20 DIAGNOSIS — E66.01 CLASS 3 SEVERE OBESITY DUE TO EXCESS CALORIES WITHOUT SERIOUS COMORBIDITY WITH BODY MASS INDEX (BMI) OF 40.0 TO 44.9 IN ADULT (MULTI): ICD-10-CM

## 2023-12-20 DIAGNOSIS — F90.9 ATTENTION DEFICIT HYPERACTIVITY DISORDER (ADHD), UNSPECIFIED ADHD TYPE: Primary | ICD-10-CM

## 2023-12-20 DIAGNOSIS — E66.09 CLASS 2 OBESITY DUE TO EXCESS CALORIES WITHOUT SERIOUS COMORBIDITY WITH BODY MASS INDEX (BMI) OF 39.0 TO 39.9 IN ADULT: ICD-10-CM

## 2023-12-20 DIAGNOSIS — F31.60 BIPOLAR DISORDER, MIXED (MULTI): ICD-10-CM

## 2023-12-20 DIAGNOSIS — F41.8 DEPRESSION WITH ANXIETY: ICD-10-CM

## 2023-12-20 DIAGNOSIS — Z13.220 LIPID SCREENING: ICD-10-CM

## 2023-12-20 LAB
ALBUMIN SERPL BCP-MCNC: 4.6 G/DL (ref 3.4–5)
ALP SERPL-CCNC: 60 U/L (ref 33–120)
ALT SERPL W P-5'-P-CCNC: 29 U/L (ref 10–52)
ANION GAP SERPL CALC-SCNC: 12 MMOL/L (ref 10–20)
AST SERPL W P-5'-P-CCNC: 21 U/L (ref 9–39)
BASOPHILS # BLD AUTO: 0.06 X10*3/UL (ref 0–0.1)
BASOPHILS NFR BLD AUTO: 1.3 %
BILIRUB SERPL-MCNC: 0.3 MG/DL (ref 0–1.2)
BUN SERPL-MCNC: 14 MG/DL (ref 6–23)
CALCIUM SERPL-MCNC: 9.6 MG/DL (ref 8.6–10.3)
CHLORIDE SERPL-SCNC: 106 MMOL/L (ref 98–107)
CHOLEST SERPL-MCNC: 210 MG/DL (ref 0–199)
CHOLESTEROL/HDL RATIO: 4.3
CO2 SERPL-SCNC: 26 MMOL/L (ref 21–32)
CREAT SERPL-MCNC: 1.12 MG/DL (ref 0.5–1.3)
EOSINOPHIL # BLD AUTO: 0.07 X10*3/UL (ref 0–0.7)
EOSINOPHIL NFR BLD AUTO: 1.5 %
ERYTHROCYTE [DISTWIDTH] IN BLOOD BY AUTOMATED COUNT: 13.8 % (ref 11.5–14.5)
GFR SERPL CREATININE-BSD FRML MDRD: >90 ML/MIN/1.73M*2
GLUCOSE SERPL-MCNC: 73 MG/DL (ref 74–99)
HCT VFR BLD AUTO: 43.5 % (ref 41–52)
HDLC SERPL-MCNC: 48.7 MG/DL
HGB BLD-MCNC: 13.6 G/DL (ref 13.5–17.5)
IMM GRANULOCYTES # BLD AUTO: 0 X10*3/UL (ref 0–0.7)
IMM GRANULOCYTES NFR BLD AUTO: 0 % (ref 0–0.9)
LDLC SERPL CALC-MCNC: 146 MG/DL
LYMPHOCYTES # BLD AUTO: 1.9 X10*3/UL (ref 1.2–4.8)
LYMPHOCYTES NFR BLD AUTO: 41.4 %
MCH RBC QN AUTO: 27.1 PG (ref 26–34)
MCHC RBC AUTO-ENTMCNC: 31.3 G/DL (ref 32–36)
MCV RBC AUTO: 87 FL (ref 80–100)
MONOCYTES # BLD AUTO: 0.3 X10*3/UL (ref 0.1–1)
MONOCYTES NFR BLD AUTO: 6.5 %
NEUTROPHILS # BLD AUTO: 2.26 X10*3/UL (ref 1.2–7.7)
NEUTROPHILS NFR BLD AUTO: 49.3 %
NON HDL CHOLESTEROL: 161 MG/DL (ref 0–149)
NRBC BLD-RTO: 0 /100 WBCS (ref 0–0)
PLATELET # BLD AUTO: 293 X10*3/UL (ref 150–450)
POTASSIUM SERPL-SCNC: 4.2 MMOL/L (ref 3.5–5.3)
PROT SERPL-MCNC: 7.4 G/DL (ref 6.4–8.2)
RBC # BLD AUTO: 5.02 X10*6/UL (ref 4.5–5.9)
SODIUM SERPL-SCNC: 140 MMOL/L (ref 136–145)
TRIGL SERPL-MCNC: 79 MG/DL (ref 0–149)
TSH SERPL-ACNC: 1.51 MIU/L (ref 0.44–3.98)
VLDL: 16 MG/DL (ref 0–40)
WBC # BLD AUTO: 4.6 X10*3/UL (ref 4.4–11.3)

## 2023-12-20 PROCEDURE — 99213 OFFICE O/P EST LOW 20 MIN: CPT | Performed by: FAMILY MEDICINE

## 2023-12-20 PROCEDURE — 1036F TOBACCO NON-USER: CPT | Performed by: FAMILY MEDICINE

## 2023-12-20 PROCEDURE — 85025 COMPLETE CBC W/AUTO DIFF WBC: CPT

## 2023-12-20 PROCEDURE — 80053 COMPREHEN METABOLIC PANEL: CPT

## 2023-12-20 PROCEDURE — 36415 COLL VENOUS BLD VENIPUNCTURE: CPT

## 2023-12-20 PROCEDURE — 84443 ASSAY THYROID STIM HORMONE: CPT

## 2023-12-20 PROCEDURE — 3008F BODY MASS INDEX DOCD: CPT | Performed by: FAMILY MEDICINE

## 2023-12-20 PROCEDURE — 80061 LIPID PANEL: CPT

## 2023-12-20 RX ORDER — DEXTROAMPHETAMINE SACCHARATE, AMPHETAMINE ASPARTATE MONOHYDRATE, DEXTROAMPHETAMINE SULFATE, AMPHETAMINE SULFATE 9.375; 9.375; 9.375; 9.375 MG/1; MG/1; MG/1; MG/1
37.5 CAPSULE, EXTENDED RELEASE ORAL DAILY
Qty: 30 CAPSULE | Refills: 0 | Status: SHIPPED | OUTPATIENT
Start: 2023-12-20 | End: 2024-01-25 | Stop reason: SDUPTHER

## 2023-12-20 ASSESSMENT — PATIENT HEALTH QUESTIONNAIRE - PHQ9
SUM OF ALL RESPONSES TO PHQ9 QUESTIONS 1 & 2: 2
1. LITTLE INTEREST OR PLEASURE IN DOING THINGS: SEVERAL DAYS
10. IF YOU CHECKED OFF ANY PROBLEMS, HOW DIFFICULT HAVE THESE PROBLEMS MADE IT FOR YOU TO DO YOUR WORK, TAKE CARE OF THINGS AT HOME, OR GET ALONG WITH OTHER PEOPLE: SOMEWHAT DIFFICULT
2. FEELING DOWN, DEPRESSED OR HOPELESS: SEVERAL DAYS

## 2023-12-20 NOTE — PATIENT INSTRUCTIONS
Patient to continue current medications (with any exceptions as noted) and diet. Follow-up in 3 month(s) otherwise as needed.      Patient is to complete fasting CBC, CMP, lipid panel labs today. Will call patient with results when available.     Patient was given refill(s) on:   Amphetamine-Dextroamphetamine XR 37.5 mg, TAKE 1 CAPSULE BY MOUTH DAILY     Rx(s) sent to pharmacy.     Encouraged patient to watch his diet and get more exercise into his routine.

## 2024-01-18 DIAGNOSIS — F31.60 BIPOLAR DISORDER, MIXED (MULTI): Primary | ICD-10-CM

## 2024-01-18 RX ORDER — OLANZAPINE AND FLUOXETINE 6; 25 MG/1; MG/1
1 CAPSULE ORAL EVERY EVENING
Qty: 30 CAPSULE | Refills: 0 | Status: SHIPPED | OUTPATIENT
Start: 2024-01-18 | End: 2024-02-16 | Stop reason: SDUPTHER

## 2024-01-18 NOTE — TELEPHONE ENCOUNTER
Rx Refill Request Telephone Encounter    Name:  Go Valdes II  :  [id number]  Medication Name:  olanzapine-fluoxetine (Symbyax) 6-25 mg   Specific Pharmacy location:  [facility]  Date of last appointment:  2023  Date of next appointment:  3/20/2024  Best number to reach patient:  [phone]

## 2024-01-25 DIAGNOSIS — F90.9 ATTENTION DEFICIT HYPERACTIVITY DISORDER (ADHD), UNSPECIFIED ADHD TYPE: Primary | ICD-10-CM

## 2024-01-25 RX ORDER — DEXTROAMPHETAMINE SACCHARATE, AMPHETAMINE ASPARTATE MONOHYDRATE, DEXTROAMPHETAMINE SULFATE, AMPHETAMINE SULFATE 9.375; 9.375; 9.375; 9.375 MG/1; MG/1; MG/1; MG/1
37.5 CAPSULE, EXTENDED RELEASE ORAL DAILY
Qty: 30 CAPSULE | Refills: 0 | Status: SHIPPED | OUTPATIENT
Start: 2024-01-25 | End: 2024-02-27 | Stop reason: SDUPTHER

## 2024-01-25 NOTE — TELEPHONE ENCOUNTER
Rx Refill Request Telephone Encounter    Name:  Go Valdes II  :  [id number]  Medication Name:  amphetamine-dextroamphetamine XR (Mydayis) 37.5 mg 24 hr capsule     Specific Pharmacy location:  [facility]  Date of last appointment:  23  Date of next appointment:  3/20/24  Best number to reach patient:  [phone]

## 2024-02-16 ENCOUNTER — TELEPHONE (OUTPATIENT)
Dept: PRIMARY CARE | Facility: CLINIC | Age: 24
End: 2024-02-16
Payer: COMMERCIAL

## 2024-02-16 DIAGNOSIS — F31.60 BIPOLAR DISORDER, MIXED (MULTI): Primary | ICD-10-CM

## 2024-02-16 NOTE — TELEPHONE ENCOUNTER
Rx Refill Request Telephone Encounter     Name:  Go Valdes II  :  [id number]  Medication Name:  olanzapine-fluoxetine 6 mg-25 mg   Specific Pharmacy location:  [facility]  Date of last appointment: 24  Date of next appointment: 3-20-24  Best number to reach patient:  [phone]

## 2024-02-16 NOTE — TELEPHONE ENCOUNTER
Rx Refill Request Telephone Encounter    Name:  Go Valdes II  :  [id number]  Medication Name:  olanzapine-fluoxetine 6 mg-25 mg   Specific Pharmacy location:  [facility]  Date of last appointment: 24  Date of next appointment: 3-20-24  Best number to reach patient:  [phone]

## 2024-02-17 RX ORDER — OLANZAPINE AND FLUOXETINE 6; 25 MG/1; MG/1
1 CAPSULE ORAL EVERY EVENING
Qty: 30 CAPSULE | Refills: 2 | Status: SHIPPED | OUTPATIENT
Start: 2024-02-17 | End: 2024-03-18 | Stop reason: SDUPTHER

## 2024-02-27 DIAGNOSIS — F90.9 ATTENTION DEFICIT HYPERACTIVITY DISORDER (ADHD), UNSPECIFIED ADHD TYPE: Primary | ICD-10-CM

## 2024-02-27 RX ORDER — DEXTROAMPHETAMINE SACCHARATE, AMPHETAMINE ASPARTATE MONOHYDRATE, DEXTROAMPHETAMINE SULFATE, AMPHETAMINE SULFATE 9.375; 9.375; 9.375; 9.375 MG/1; MG/1; MG/1; MG/1
37.5 CAPSULE, EXTENDED RELEASE ORAL DAILY
Qty: 30 CAPSULE | Refills: 0 | Status: SHIPPED | OUTPATIENT
Start: 2024-02-27 | End: 2024-03-28 | Stop reason: SDUPTHER

## 2024-02-27 NOTE — TELEPHONE ENCOUNTER
Rx Refill Request     Name: Go Valdes II  :  [id number]   Medication Name:    Mydayis 37.5mg    Last fill: 2024 30 day supply Q 30 R 0  CSA: 10.20.2023 UP TO DATE   UDS: 2023 UP TO DATE   Specific Pharmacy location:  [facility]  Date of last appointment:  2023   Date of next appointment:  3/20/2024   Best number to reach patient:  [phone]       RX PENDED to [facility] as directed by patient call.

## 2024-02-27 NOTE — TELEPHONE ENCOUNTER
Patient called in requesting medication refill for   Mydayis 37.5mg   Pharmacy [facility] [address]   Nov 3/20/24  Please advise

## 2024-03-18 DIAGNOSIS — F31.60 BIPOLAR DISORDER, MIXED (MULTI): ICD-10-CM

## 2024-03-18 RX ORDER — OLANZAPINE AND FLUOXETINE 6; 25 MG/1; MG/1
1 CAPSULE ORAL EVERY EVENING
Qty: 30 CAPSULE | Refills: 2 | Status: SHIPPED | OUTPATIENT
Start: 2024-03-18 | End: 2024-05-20 | Stop reason: SDUPTHER

## 2024-03-18 NOTE — TELEPHONE ENCOUNTER
Rx Refill Request Telephone Encounter    Name:  Go Valdes II  :  [id number]  Medication Name:  OLANZIPINE-FLUOXETINE 6-25MG CAPSULE  Specific Pharmacy location:  [facility]   Date of last appointment: 23  Date of next appointment:  3/20/24  Best number to reach patient:  [phone]

## 2024-03-20 ENCOUNTER — OFFICE VISIT (OUTPATIENT)
Dept: PRIMARY CARE | Facility: CLINIC | Age: 24
End: 2024-03-20
Payer: COMMERCIAL

## 2024-03-20 VITALS
DIASTOLIC BLOOD PRESSURE: 68 MMHG | HEIGHT: 67 IN | RESPIRATION RATE: 16 BRPM | TEMPERATURE: 97.4 F | OXYGEN SATURATION: 97 % | SYSTOLIC BLOOD PRESSURE: 114 MMHG | WEIGHT: 262 LBS | BODY MASS INDEX: 41.12 KG/M2 | HEART RATE: 99 BPM

## 2024-03-20 DIAGNOSIS — F31.60 BIPOLAR DISORDER, MIXED (MULTI): ICD-10-CM

## 2024-03-20 DIAGNOSIS — F25.0 SCHIZOAFFECTIVE DISORDER, BIPOLAR TYPE (MULTI): ICD-10-CM

## 2024-03-20 DIAGNOSIS — F41.8 DEPRESSION WITH ANXIETY: ICD-10-CM

## 2024-03-20 DIAGNOSIS — F90.9 ATTENTION DEFICIT HYPERACTIVITY DISORDER (ADHD), UNSPECIFIED ADHD TYPE: Primary | ICD-10-CM

## 2024-03-20 DIAGNOSIS — F32.1 CURRENT MODERATE EPISODE OF MAJOR DEPRESSIVE DISORDER WITHOUT PRIOR EPISODE (MULTI): ICD-10-CM

## 2024-03-20 DIAGNOSIS — E66.01 CLASS 3 SEVERE OBESITY DUE TO EXCESS CALORIES WITHOUT SERIOUS COMORBIDITY WITH BODY MASS INDEX (BMI) OF 40.0 TO 44.9 IN ADULT (MULTI): ICD-10-CM

## 2024-03-20 PROCEDURE — 3008F BODY MASS INDEX DOCD: CPT | Performed by: FAMILY MEDICINE

## 2024-03-20 PROCEDURE — 99213 OFFICE O/P EST LOW 20 MIN: CPT | Performed by: FAMILY MEDICINE

## 2024-03-20 PROCEDURE — 1036F TOBACCO NON-USER: CPT | Performed by: FAMILY MEDICINE

## 2024-03-20 NOTE — PATIENT INSTRUCTIONS
Patient to continue current medications (with any exceptions as noted) and diet. Follow-up in 3 month(s) otherwise as needed.      Encouraged patient to watch his diet and get more exercise into his routine.

## 2024-03-20 NOTE — ASSESSMENT & PLAN NOTE
Schizoaffective disorder, bipolar type is controlled.  Patient to continue with current medications and treatment plan.  Follow-up at least annually.  Patient is being seen every 3 months in the office.  He has been encouraged to watch his diet and lose weight.

## 2024-03-20 NOTE — PROGRESS NOTES
"Subjective   Patient ID: Go Valdes II is a 23 y.o. male who presents for Follow-up. I last saw the patient 12/20/2023.     HPI   He is doing well on his medications and taking them regularly.     He notes that he started going to the gym recently.     Review of Systems  Except positives as noted in the CC & HPI      Constitutional: Denies fevers, chills, night sweats, fatigue, weight changes, change in appetite    Eyes: Denies blurry vision, double vision    ENT: Denies otalgia, trouble hearing, tinnitus, vertigo, nasal congestion, rhinorrhea, sore throat    Neck: Denies swelling, masses    Cardiovascular: Denies chest pain, palpitations, edema, orthopnea, syncope    Respiratory: Denies dyspnea, cough, wheezing, postural nocturnal dyspnea    Gastrointestinal: Denies abdominal pain, nausea, vomiting, diarrhea, constipation, melena, hematochezia    Genitourinary: Denies dysuria, hematuria, frequency, urgency    Musculoskeletal: Denies back pain, neck pain, arthralgias, myalgias    Integumentary: Denies skin lesions, rashes, masses    Neurological: Denies dizziness, headaches, confusion, limb weakness, paresthesias, syncope, convulsions    Psychiatric: Denies depression, anxiety, homicidal ideations, suicidal ideations, sleep disturbances    Endocrine: Denies polyphagia, polydipsia, polyuria, weakness, hair thinning, heat intolerance, cold intolerance, weight changes    Heme/Lymph: Denies easy bruising, easy bleeding, swollen glands    Objective   /68 (BP Location: Right arm, Patient Position: Sitting)   Pulse 99   Temp 36.3 °C (97.4 °F)   Resp 16   Ht 1.702 m (5' 7\")   Wt 119 kg (262 lb)   SpO2 97%   BMI 41.04 kg/m²     Physical Exam  114/68 on recheck of BP in the right arm.     General Appearance - well-developed, obese, 23 y.o., Black male in no acute distress.     Skin - warm, pink and dry without rash or concerning lesions.     Mental Status - alert and oriented x 3. Normal mood and affect " appropriate to mood.     Neck - supple without lymphadenopathy. Carotid pulses are normal without bruits. Thyroid is normal in midline without nodules.     Chest - lungs are clear to auscultation without rales, rhonchi or wheezes.     Heart - regular, rate and rhythm without murmurs, rubs or gallops.    Abdomen - soft, moderately obese, protuberant, nontender, nondistended. No masses, hepatomegaly or splenomegaly is noted. No rebound, rigidity or guarding is noted. Bowel sounds are normoactive.     Extremities - no cyanosis, clubbing or edema. Pedal pulses are 2+ normal at the dorsalis pedis and posterior pulses bilaterally.     Neurological - cranial nerves II through XII are grossly intact. Motor strength 5/5 at all fours.     Lab Results   Component Value Date    ALBUMIN 4.6 12/20/2023    ALT 29 12/20/2023    AST 21 12/20/2023    BUN 14 12/20/2023    CALCIUM 9.6 12/20/2023     12/20/2023    CHOL 210 (H) 12/20/2023    CO2 26 12/20/2023    CREATININE 1.12 12/20/2023    EGFR >90 12/20/2023    GLUCOSE 73 (L) 12/20/2023    HDL 48.7 12/20/2023    HCT 43.5 12/20/2023    HGB 13.6 12/20/2023    K 4.2 12/20/2023    LDLCALC 146 (H) 12/20/2023     12/20/2023     12/20/2023    TRIG 79 12/20/2023    TSH 1.51 12/20/2023    WBC 4.6 12/20/2023     Assessment/Plan   1. Attention deficit hyperactivity disorder (ADHD), unspecified ADHD type  Follow Up In [facility] - PCP - Established      2. Bipolar disorder, mixed (CMS/HCC)  Follow Up In [facility] - PCP - Established      3. Depression with anxiety  Follow Up In [facility] - PCP - Established      4. Current moderate episode of major depressive disorder without prior episode (CMS/HCC)        5. Schizoaffective disorder, bipolar type (CMS/HCC)        6. Class 3 severe obesity due to excess calories without serious comorbidity with body mass index (BMI) of 40.0 to 44.9 in adult (CMS/HCC)        Patient to continue current  medications (with any exceptions as noted) and diet. Follow-up in 3 month(s) otherwise as needed.      Encouraged patient to watch his diet and get more exercise into his routine.       Scribe Attestation  By signing my name below, ILaura Scribe   attest that this documentation has been prepared under the direction and in the presence of Medhat Owens MD.

## 2024-03-20 NOTE — ASSESSMENT & PLAN NOTE
Condition is stable. Patient is to continue current medications and regimen. Patient is to follow up with psychiatry to monitor his/her condition at least once annually.

## 2024-03-28 DIAGNOSIS — F90.9 ATTENTION DEFICIT HYPERACTIVITY DISORDER (ADHD), UNSPECIFIED ADHD TYPE: Primary | ICD-10-CM

## 2024-03-28 RX ORDER — DEXTROAMPHETAMINE SACCHARATE, AMPHETAMINE ASPARTATE MONOHYDRATE, DEXTROAMPHETAMINE SULFATE, AMPHETAMINE SULFATE 9.375; 9.375; 9.375; 9.375 MG/1; MG/1; MG/1; MG/1
37.5 CAPSULE, EXTENDED RELEASE ORAL DAILY
Qty: 30 CAPSULE | Refills: 0 | Status: SHIPPED | OUTPATIENT
Start: 2024-03-28 | End: 2024-05-01 | Stop reason: SDUPTHER

## 2024-03-28 NOTE — TELEPHONE ENCOUNTER
Rx Refill Request Telephone Encounter    Name:  Go Valdes II  :  [id number]  Medication Name:  MYDAYIS 37.5 MG ; LAST SENT BY PCP ON 3/20 W/ 0 REFILLS  Specific Pharmacy location:  [facility] [address]  Date of last appointment: 3/20  Date of next appointment:    Best number to reach patient:  [phone]    PLEASE ADVISE.

## 2024-05-01 DIAGNOSIS — F90.9 ATTENTION DEFICIT HYPERACTIVITY DISORDER (ADHD), UNSPECIFIED ADHD TYPE: ICD-10-CM

## 2024-05-01 RX ORDER — DEXTROAMPHETAMINE SACCHARATE, AMPHETAMINE ASPARTATE MONOHYDRATE, DEXTROAMPHETAMINE SULFATE, AMPHETAMINE SULFATE 9.375; 9.375; 9.375; 9.375 MG/1; MG/1; MG/1; MG/1
37.5 CAPSULE, EXTENDED RELEASE ORAL DAILY
Qty: 30 CAPSULE | Refills: 0 | Status: SHIPPED | OUTPATIENT
Start: 2024-05-01 | End: 2024-06-03 | Stop reason: SDUPTHER

## 2024-05-01 NOTE — TELEPHONE ENCOUNTER
Patient called requesting medication refills for   Mydavis 37.5mg   Pharmacy [facility] [address]   Please advise

## 2024-05-01 NOTE — TELEPHONE ENCOUNTER
Rx Refill Request Telephone Encounter    Name:  Go Valdes II  :  [id number]  Medication Name:  amphetamine-dextroamphetamine XR (Mydayis) 37.5 mg 24 hr capsule   Specific Pharmacy location:  [id number] [address]   Date of last appointment:  3/20/24  Date of next appointment:  24

## 2024-05-20 DIAGNOSIS — F31.60 BIPOLAR DISORDER, MIXED (MULTI): ICD-10-CM

## 2024-05-20 RX ORDER — OLANZAPINE AND FLUOXETINE 6; 25 MG/1; MG/1
1 CAPSULE ORAL EVERY EVENING
Qty: 30 CAPSULE | Refills: 2 | Status: SHIPPED | OUTPATIENT
Start: 2024-05-20

## 2024-05-20 NOTE — TELEPHONE ENCOUNTER
Rx Refill Request Telephone Encounter    Name:  Go Valdes II  :  [id number]  Medication Name:  olanzapine-fluoxetine (Symbyax) 6-25 mg   Specific Pharmacy location:  [facility] [address]  Date of last appointment:  3/20  Date of next appointment:    Best number to reach patient:  [phone]

## 2024-06-03 DIAGNOSIS — F90.9 ATTENTION DEFICIT HYPERACTIVITY DISORDER (ADHD), UNSPECIFIED ADHD TYPE: ICD-10-CM

## 2024-06-03 RX ORDER — DEXTROAMPHETAMINE SACCHARATE, AMPHETAMINE ASPARTATE MONOHYDRATE, DEXTROAMPHETAMINE SULFATE, AMPHETAMINE SULFATE 9.375; 9.375; 9.375; 9.375 MG/1; MG/1; MG/1; MG/1
37.5 CAPSULE, EXTENDED RELEASE ORAL DAILY
Qty: 30 CAPSULE | Refills: 0 | Status: SHIPPED | OUTPATIENT
Start: 2024-06-03 | End: 2024-07-03

## 2024-06-03 NOTE — TELEPHONE ENCOUNTER
Rx Refill Request Telephone Encounter    Name:  Go Valdes II  :  [id number]  Medication Name:  amphetamine-dextroamphetamine XR (Mydayis) 37.5 mg   Specific Pharmacy location:  [facility] [address]  Date of last appointment:  3/20  Date of next appointment:    Best number to reach patient:  [phone]           UDS/CSA   on 2022

## 2024-06-19 ENCOUNTER — APPOINTMENT (OUTPATIENT)
Dept: PRIMARY CARE | Facility: CLINIC | Age: 24
End: 2024-06-19
Payer: COMMERCIAL

## 2024-06-19 VITALS
DIASTOLIC BLOOD PRESSURE: 70 MMHG | WEIGHT: 273.2 LBS | RESPIRATION RATE: 18 BRPM | OXYGEN SATURATION: 98 % | HEART RATE: 101 BPM | HEIGHT: 67 IN | SYSTOLIC BLOOD PRESSURE: 122 MMHG | BODY MASS INDEX: 42.88 KG/M2 | TEMPERATURE: 97.3 F

## 2024-06-19 DIAGNOSIS — F31.60 BIPOLAR DISORDER, MIXED (MULTI): ICD-10-CM

## 2024-06-19 DIAGNOSIS — F90.9 ATTENTION DEFICIT HYPERACTIVITY DISORDER (ADHD), UNSPECIFIED ADHD TYPE: ICD-10-CM

## 2024-06-19 DIAGNOSIS — F41.8 DEPRESSION WITH ANXIETY: ICD-10-CM

## 2024-06-19 PROCEDURE — 99213 OFFICE O/P EST LOW 20 MIN: CPT | Performed by: FAMILY MEDICINE

## 2024-06-19 PROCEDURE — 3008F BODY MASS INDEX DOCD: CPT | Performed by: FAMILY MEDICINE

## 2024-06-19 PROCEDURE — 1036F TOBACCO NON-USER: CPT | Performed by: FAMILY MEDICINE

## 2024-06-19 ASSESSMENT — PATIENT HEALTH QUESTIONNAIRE - PHQ9
SUM OF ALL RESPONSES TO PHQ9 QUESTIONS 1 & 2: 0
2. FEELING DOWN, DEPRESSED OR HOPELESS: NOT AT ALL
1. LITTLE INTEREST OR PLEASURE IN DOING THINGS: NOT AT ALL

## 2024-06-19 NOTE — PROGRESS NOTES
Subjective   Patient ID: Go Valdes II is a 24 y.o. male who presents for Follow-up (Attention deficit hyperactivity disorder (ADHD), unspecified ADHD type//Bipolar disorder, mixed (Multi)//Depression with anxiety//Current moderate episode of major depressive disorder without prior episode (Multi)//Schizoaffective disorder, bipolar type (Multi)). I last saw the patient 3/20/24.     HPI       Patient is here for a F/U  Past medical, surgical, and family history reviewed.  Reviewed and documented all medications (prescriptions, OTCs, herbal therapies and supplements)   Pt eating well, exercising as tolerated and taking medications as directed  Has no medical concerns for rooming MA    Patient is here to discuss about his medications and he has increased frequency of urination. He mentions increased bowel movement per day but denies loose stools or hard stools. Patient has questions about trying a medication called Quelbree or viloxazine.      Review of Systems  Except positives as noted in the CC & HPI      Constitutional: Denies fevers, chills, night sweats, fatigue, weight changes, change in appetite    Eyes: Denies blurry vision, double vision    ENT: Denies otalgia, trouble hearing, tinnitus, vertigo, nasal congestion, rhinorrhea, sore throat    Neck: Denies swelling, masses    Cardiovascular: Denies chest pain, palpitations, edema, orthopnea, syncope    Respiratory: Denies dyspnea, cough, wheezing, postural nocturnal dyspnea    Gastrointestinal: Denies abdominal pain, nausea, vomiting, diarrhea, constipation, melena, hematochezia    Genitourinary: Denies dysuria, hematuria, frequency, urgency    Musculoskeletal: Denies back pain, neck pain, arthralgias, myalgias    Integumentary: Denies skin lesions, rashes, masses    Neurological: Denies dizziness, headaches, confusion, limb weakness, paresthesias, syncope, convulsions    Psychiatric: Denies depression, anxiety, homicidal ideations, suicidal ideations, sleep  "disturbances    Endocrine: Denies polyphagia, polydipsia, polyuria, weakness, hair thinning, heat intolerance, cold intolerance, weight changes    Heme/Lymph: Denies easy bruising, easy bleeding, swollen glands    Objective   /70 (BP Location: Right arm, Patient Position: Sitting)   Pulse 101   Temp 36.3 °C (97.3 °F) (Temporal)   Resp 18   Ht 1.702 m (5' 7\")   Wt 124 kg (273 lb 3.2 oz)   SpO2 98%   BMI 42.79 kg/m²     Physical Exam  132/84 on recheck of BP in the right arm.     General Appearance - well-developed, obese, 24 y.o., Black male in no acute distress.     Skin - warm, pink and dry without rash or concerning lesions.     Mental Status - alert and oriented x 3. Normal mood and affect appropriate to mood.     Neck - supple without lymphadenopathy. Carotid pulses are normal without bruits. Thyroid is normal in midline without nodules.     Chest - lungs are clear to auscultation without rales, rhonchi or wheezes.     Heart - regular, rate and rhythm without murmurs, rubs or gallops.    Abdomen - soft, moderately obese, protuberant, nontender, nondistended. No masses, hepatomegaly or splenomegaly is noted. No rebound, rigidity or guarding is noted. Bowel sounds are normoactive.     Extremities - no cyanosis, clubbing or edema. Pedal pulses are 2+ normal at the dorsalis pedis and posterior pulses bilaterally.     Neurological - cranial nerves II through XII are grossly intact. Motor strength 5/5 at all fours.     Lab Results   Component Value Date    ALBUMIN 4.6 12/20/2023    ALT 29 12/20/2023    AST 21 12/20/2023    BUN 14 12/20/2023    CALCIUM 9.6 12/20/2023     12/20/2023    CHOL 210 (H) 12/20/2023    CO2 26 12/20/2023    CREATININE 1.12 12/20/2023    EGFR >90 12/20/2023    GLUCOSE 73 (L) 12/20/2023    HDL 48.7 12/20/2023    HCT 43.5 12/20/2023    HGB 13.6 12/20/2023    K 4.2 12/20/2023    LDLCALC 146 (H) 12/20/2023     12/20/2023     12/20/2023    TRIG 79 12/20/2023    TSH " 1.51 12/20/2023    WBC 4.6 12/20/2023     Assessment/Plan   1. Attention deficit hyperactivity disorder (ADHD), unspecified ADHD type  Follow [facility] In [facility] - PCP - Established    viloxazine 100 mg capsule,extended release 24hr      2. Bipolar disorder, mixed (Multi)  Follow Up In [facility] - PCP - Established      3. Depression with anxiety  Follow Up In [facility] - PCP - Established      Patient to continue current medications (with any exceptions as noted) and diet. Follow-up in 3 month(s), otherwise as needed.      If the patient is able to take Viloxazine then he can see me in 6-8 weeks.    Patient was started on:   Viloxazine 100 mg capsule extended release 24 hr, Take 2 capsules (200 mg) by mouth once daily.   Patient is to take 1 capsule a day for 5 days then increase it to 2 capsules.    Rx(s) sent to pharmacy.     I did review the PDR for the new medication.    Encouraged patient to watch his diet and get more exercise into his routine.     Scribe Attestation  By signing my name below, I, Sarah Rocha , Bobbi   attest that this documentation has been prepared under the direction and in the presence of Medhat Owens MD.

## 2024-06-20 NOTE — TELEPHONE ENCOUNTER
We received a request for prior authorization on the patient's Viloxazine from their pharmacy. Prior authorization was submitted to insurance today. We will await their determination.     It appears the medication has a step therapy- Atomoxetine Cap which patient has not tried on record. We will await final decision of PA

## 2024-06-20 NOTE — TELEPHONE ENCOUNTER
Approval Details    Authorized from June 20, 2024 to June 19, 2025    Rx was reset up at [facility]

## 2024-06-21 DIAGNOSIS — F90.9 ATTENTION DEFICIT HYPERACTIVITY DISORDER (ADHD), UNSPECIFIED ADHD TYPE: ICD-10-CM

## 2024-06-21 RX ORDER — VILOXAZINE HYDROCHLORIDE 100 MG/1
200 CAPSULE, EXTENDED RELEASE ORAL DAILY
Qty: 60 CAPSULE | Refills: 0 | Status: SHIPPED | OUTPATIENT
Start: 2024-06-21 | End: 2024-06-21 | Stop reason: SDUPTHER

## 2024-06-21 RX ORDER — VILOXAZINE HYDROCHLORIDE 100 MG/1
200 CAPSULE, EXTENDED RELEASE ORAL DAILY
Qty: 60 CAPSULE | Refills: 0 | Status: SHIPPED | OUTPATIENT
Start: 2024-06-21

## 2024-07-17 DIAGNOSIS — F90.9 ATTENTION DEFICIT HYPERACTIVITY DISORDER (ADHD), UNSPECIFIED ADHD TYPE: ICD-10-CM

## 2024-07-17 DIAGNOSIS — F31.60 BIPOLAR DISORDER, MIXED (MULTI): ICD-10-CM

## 2024-07-17 RX ORDER — VILOXAZINE HYDROCHLORIDE 100 MG/1
200 CAPSULE, EXTENDED RELEASE ORAL DAILY
Qty: 60 CAPSULE | Refills: 0 | Status: SHIPPED | OUTPATIENT
Start: 2024-07-17

## 2024-07-17 RX ORDER — OLANZAPINE AND FLUOXETINE 6; 25 MG/1; MG/1
1 CAPSULE ORAL EVERY EVENING
Qty: 30 CAPSULE | Refills: 2 | Status: SHIPPED | OUTPATIENT
Start: 2024-07-17

## 2024-07-17 NOTE — TELEPHONE ENCOUNTER
CSA- 10/20/23 (DUE SOON)  UDS- 9/27/23 (DUE SOON)  Last filled on   Symbyax- 6/28/24  Qelbree- 6/24/24  Rx pended to pts preferred pharmacy

## 2024-07-17 NOTE — TELEPHONE ENCOUNTER
Rx Refill Request Telephone Encounter    Name:  Go Valdes II  :  [id number]  Medication Name:    Qelbree 100 mg capsule,extended release 24h     olanzapine-fluoxetine (Symbyax) 6-25 mg capsule   Specific Pharmacy location:  [facility]/pharmacy #[id number] [facility] [address] [facility]   Date of last appointment:  24  Date of next appointment:  24  Best number to reach patient:  Phone:   [phone]

## 2024-07-22 ENCOUNTER — TELEPHONE (OUTPATIENT)
Dept: PRIMARY CARE | Facility: CLINIC | Age: 24
End: 2024-07-22
Payer: COMMERCIAL

## 2024-07-22 NOTE — TELEPHONE ENCOUNTER
Patient called in stating the PA on his olanzapine-fluoxetine (Symbyax) 6-25 mg capsule is needing to be renewed. He is asking for this to be started  Please Advise

## 2024-07-23 NOTE — TELEPHONE ENCOUNTER
We received a request for prior authorization on the patient's olanzapine-fluoxetine (Symbyax) 6-25 mg capsule  from their pharmacy. Prior authorization was submitted to insurance today. We will await their determination.

## 2024-07-24 NOTE — TELEPHONE ENCOUNTER
Prior Authorization  has been APPROVED.    Your PA request for [id number] was approved for 365 days.   The PA# assigned is [id number].

## 2024-08-07 ENCOUNTER — APPOINTMENT (OUTPATIENT)
Dept: PRIMARY CARE | Facility: CLINIC | Age: 24
End: 2024-08-07
Payer: COMMERCIAL

## 2024-08-12 ENCOUNTER — APPOINTMENT (OUTPATIENT)
Dept: PRIMARY CARE | Facility: CLINIC | Age: 24
End: 2024-08-12
Payer: COMMERCIAL

## 2024-08-20 DIAGNOSIS — F90.9 ATTENTION DEFICIT HYPERACTIVITY DISORDER (ADHD), UNSPECIFIED ADHD TYPE: ICD-10-CM

## 2024-08-20 DIAGNOSIS — F31.60 BIPOLAR DISORDER, MIXED (MULTI): ICD-10-CM

## 2024-08-20 RX ORDER — VILOXAZINE HYDROCHLORIDE 100 MG/1
200 CAPSULE, EXTENDED RELEASE ORAL DAILY
Qty: 60 CAPSULE | Refills: 2 | Status: SHIPPED | OUTPATIENT
Start: 2024-08-20

## 2024-08-20 RX ORDER — OLANZAPINE AND FLUOXETINE 6; 25 MG/1; MG/1
1 CAPSULE ORAL EVERY EVENING
Qty: 30 CAPSULE | Refills: 2 | Status: SHIPPED | OUTPATIENT
Start: 2024-08-20

## 2024-08-20 NOTE — TELEPHONE ENCOUNTER
Rx Refill Request     Name: Go Valdes II  :  2000   Medication Name:  SYMBYAX 6/25MG       QUELBREE 100MG  Specific Pharmacy location:  [facility]  Date of last appointment:  2024   Date of next appointment:  2024   Best number to reach patient:  [phone]

## 2024-09-18 ENCOUNTER — APPOINTMENT (OUTPATIENT)
Dept: PRIMARY CARE | Facility: CLINIC | Age: 24
End: 2024-09-18
Payer: COMMERCIAL

## 2024-09-18 VITALS
TEMPERATURE: 97.5 F | DIASTOLIC BLOOD PRESSURE: 82 MMHG | OXYGEN SATURATION: 99 % | HEART RATE: 80 BPM | SYSTOLIC BLOOD PRESSURE: 118 MMHG | RESPIRATION RATE: 16 BRPM | BODY MASS INDEX: 42.22 KG/M2 | WEIGHT: 269 LBS | HEIGHT: 67 IN

## 2024-09-18 DIAGNOSIS — F90.2 ATTENTION DEFICIT HYPERACTIVITY DISORDER (ADHD), COMBINED TYPE: Primary | ICD-10-CM

## 2024-09-18 DIAGNOSIS — R55 SYNCOPE, UNSPECIFIED SYNCOPE TYPE: ICD-10-CM

## 2024-09-18 DIAGNOSIS — F31.60 BIPOLAR DISORDER, MIXED (MULTI): ICD-10-CM

## 2024-09-18 DIAGNOSIS — F32.1 CURRENT MODERATE EPISODE OF MAJOR DEPRESSIVE DISORDER WITHOUT PRIOR EPISODE (MULTI): ICD-10-CM

## 2024-09-18 PROCEDURE — 1036F TOBACCO NON-USER: CPT | Performed by: FAMILY MEDICINE

## 2024-09-18 PROCEDURE — 3008F BODY MASS INDEX DOCD: CPT | Performed by: FAMILY MEDICINE

## 2024-09-18 PROCEDURE — 99214 OFFICE O/P EST MOD 30 MIN: CPT | Performed by: FAMILY MEDICINE

## 2024-09-18 RX ORDER — OLANZAPINE AND FLUOXETINE 6; 25 MG/1; MG/1
1 CAPSULE ORAL EVERY EVENING
Qty: 30 CAPSULE | Refills: 2 | Status: SHIPPED | OUTPATIENT
Start: 2024-09-18

## 2024-09-18 RX ORDER — DEXTROAMPHETAMINE SACCHARATE, AMPHETAMINE ASPARTATE MONOHYDRATE, DEXTROAMPHETAMINE SULFATE, AMPHETAMINE SULFATE 6.25; 6.25; 6.25; 6.25 MG/1; MG/1; MG/1; MG/1
25 CAPSULE, EXTENDED RELEASE ORAL EVERY MORNING
Qty: 30 CAPSULE | Refills: 0 | Status: SHIPPED | OUTPATIENT
Start: 2024-09-18 | End: 2024-10-18

## 2024-09-18 ASSESSMENT — PATIENT HEALTH QUESTIONNAIRE - PHQ9
SUM OF ALL RESPONSES TO PHQ9 QUESTIONS 1 AND 2: 0
2. FEELING DOWN, DEPRESSED OR HOPELESS: NOT AT ALL
1. LITTLE INTEREST OR PLEASURE IN DOING THINGS: NOT AT ALL

## 2024-09-18 NOTE — PROGRESS NOTES
Subjective   Patient ID: Go Valdes II is a 24 y.o. male who presents for Hospital Follow-up and Follow-up (Attention deficit hyperactivity disorder (ADHD), unspecified ADHD type; Bipolar disorder, mixed (Multi); Depression with anxiety/). I last saw the patient 6/19/2024    HPI     Patient is here for a ED F/U  Pt said no to flu shot today  Pt went to [facility] on 9/14 for nausea, vomiting, unspecified vomiting type and Syncope, unspecified syncope type. He states he is feeling better but would like to discuss what happened at the hospital. He denies chest pain/ SOB/palpitations.    Past medical, surgical, and family history reviewed.  Reviewed and documented all medications   Pt eating well, exercising as tolerated and taking medications as directed      Review of Systems  Except positives as noted in the CC & HPI      Constitutional: Denies fevers, chills, night sweats, fatigue, weight changes, change in appetite    Eyes: Denies blurry vision, double vision    ENT: Denies otalgia, trouble hearing, tinnitus, vertigo, nasal congestion, rhinorrhea, sore throat    Neck: Denies swelling, masses    Cardiovascular: Denies chest pain, palpitations, edema, orthopnea, syncope    Respiratory: Denies dyspnea, cough, wheezing, postural nocturnal dyspnea    Gastrointestinal: Denies abdominal pain, nausea, vomiting, diarrhea, constipation, melena, hematochezia    Genitourinary: Denies dysuria, hematuria, frequency, urgency    Musculoskeletal: Denies back pain, neck pain, arthralgias, myalgias    Integumentary: Denies skin lesions, rashes, masses    Neurological: Denies dizziness, headaches, confusion, limb weakness, paresthesias, syncope, convulsions    Psychiatric: Denies depression, anxiety, homicidal ideations, suicidal ideations, sleep disturbances    Endocrine: Denies polyphagia, polydipsia, polyuria, weakness, hair thinning, heat intolerance, cold intolerance, weight changes    Heme/Lymph: Denies easy  "bruising, easy bleeding, swollen glands    Objective   /82 (BP Location: Right arm, Patient Position: Sitting, BP Cuff Size: Adult long)   Pulse 80   Temp 36.4 °C (97.5 °F)   Resp 16   Ht 1.702 m (5' 7\")   Wt 122 kg (269 lb)   SpO2 99%   BMI 42.13 kg/m²     Physical Exam  118/82 on recheck of BP in the right arm.     General Appearance - well-developed, obese, 24 y.o., Black male in no acute distress.     Skin - warm, pink and dry without rash or concerning lesions.     Mental Status - alert and oriented x 3. Normal mood and affect appropriate to mood.     Neck - supple without lymphadenopathy. Carotid pulses are normal without bruits. Thyroid is normal in midline without nodules.     Chest - lungs are clear to auscultation without rales, rhonchi or wheezes.     Heart - regular, rate and rhythm without murmurs, rubs or gallops.    Abdomen - soft, moderately obese, protuberant, nontender, nondistended. No masses, hepatomegaly or splenomegaly is noted. No rebound, rigidity or guarding is noted. Bowel sounds are normoactive.     Extremities - no cyanosis, clubbing or edema. Pedal pulses are 2+ normal at the dorsalis pedis and posterior pulses bilaterally.     Neurological - cranial nerves II through XII are grossly intact. Motor strength 5/5 at all fours.     Lab Results   Component Value Date    ALBUMIN 4.6 12/20/2023    ALT 29 12/20/2023    AST 21 12/20/2023    BUN 14 12/20/2023    CALCIUM 9.6 12/20/2023     12/20/2023    CHOL 210 (H) 12/20/2023    CO2 26 12/20/2023    CREATININE 1.12 12/20/2023    EGFR >90 12/20/2023    GLUCOSE 73 (L) 12/20/2023    HDL 48.7 12/20/2023    HCT 43.5 12/20/2023    HGB 13.6 12/20/2023    K 4.2 12/20/2023    LDLCALC 146 (H) 12/20/2023     12/20/2023     12/20/2023    TRIG 79 12/20/2023    TSH 1.51 12/20/2023    WBC 4.6 12/20/2023     Assessment/Plan   1. Attention deficit hyperactivity disorder (ADHD), combined type  amphetamine-dextroamphetamine XR " (Mydayis) 25 mg 24 hr capsule      2. Bipolar disorder, mixed (Multi)  olanzapine-fluoxetine (Symbyax) 6-25 mg capsule      3. Current moderate episode of major depressive disorder without prior episode (Multi)        4. Syncope, unspecified syncope type  Holter or Event Cardiac Monitor        Patient to continue current medications (with any exceptions as noted) and diet. Follow-up in 1 month(s), otherwise as needed.     Ordered Holter or Event Cardiac Monitor. Will call patient with results when available.     Patient was given refill(s) on:   Amphetamine-Dextroamphetamine 25 mg, TAKE 1 CAPSULE BY MOUTH DAILY   Olanzapine-fluoxetine (Symbyax) 6-25 mg capsule, TAKE 1 CAPSULE BY MOUTH ONCE DAILY IN THE EVENING.     Rx(s) sent to pharmacy.       Encouraged patient to drink plenty of fluids, at least 64 oz of water daily.      Scribe Attestation  By signing my name below, ISarah , Scrsarah   attest that this documentation has been prepared under the direction and in the presence of Medhat Owens MD.

## 2024-09-25 ENCOUNTER — APPOINTMENT (OUTPATIENT)
Dept: CARDIOLOGY | Facility: CLINIC | Age: 24
End: 2024-09-25
Payer: COMMERCIAL

## 2024-09-25 DIAGNOSIS — R55 SYNCOPE, UNSPECIFIED SYNCOPE TYPE: ICD-10-CM

## 2024-09-25 PROCEDURE — 93225 XTRNL ECG REC<48 HRS REC: CPT | Performed by: INTERNAL MEDICINE

## 2024-09-25 PROCEDURE — 93227 XTRNL ECG REC<48 HR R&I: CPT | Performed by: INTERNAL MEDICINE

## 2024-10-15 NOTE — PROGRESS NOTES
Subjective   Patient ID: Go Valdes II is a 24 y.o. male who presents for Follow-up (Attention deficit hyperactivity disorder (ADHD), combined type/Bipolar disorder, mixed (Multi)/Current moderate episode of major depressive disorder without prior episode (Multi)). I last saw the patient 9/18/2024    HPI   Patient is here for a F/U    Past medical, surgical, and family history reviewed.  Reviewed and documented all medications   Pt eating well, exercising as tolerated and taking medications as directed    Patient's weight has been the same from his last visit.    Patient mentions having difficulty sleeping at night with snoring. He notes daytime drowsiness and fatigue. He mentions feeling better with his snoring when he sleeps on his side.      Review of Systems  Except positives as noted in the CC & HPI      Constitutional: Denies fevers, chills, night sweats, fatigue, weight changes, change in appetite    Eyes: Denies blurry vision, double vision    ENT: Denies otalgia, trouble hearing, tinnitus, vertigo, nasal congestion, rhinorrhea, sore throat    Neck: Denies swelling, masses    Cardiovascular: Denies chest pain, palpitations, edema, orthopnea, syncope    Respiratory: Denies dyspnea, cough, wheezing, postural nocturnal dyspnea    Gastrointestinal: Denies abdominal pain, nausea, vomiting, diarrhea, constipation, melena, hematochezia    Genitourinary: Denies dysuria, hematuria, frequency, urgency    Musculoskeletal: Denies back pain, neck pain, arthralgias, myalgias    Integumentary: Denies skin lesions, rashes, masses    Neurological: Denies dizziness, headaches, confusion, limb weakness, paresthesias, syncope, convulsions    Psychiatric: Denies depression, anxiety, homicidal ideations, suicidal ideations, sleep disturbances    Endocrine: Denies polyphagia, polydipsia, polyuria, weakness, hair thinning, heat intolerance, cold intolerance, weight changes    Heme/Lymph: Denies easy bruising, easy bleeding,  "swollen glands    Objective   BP (!) 124/92 (BP Location: Right arm, Patient Position: Sitting, BP Cuff Size: Adult long)   Pulse 103   Temp 36.2 °C (97.2 °F)   Resp 16   Ht 1.702 m (5' 7\")   Wt 122 kg (269 lb)   SpO2 96%   BMI 42.13 kg/m²     Physical Exam  124/92 on recheck of BP in the right arm.     General Appearance - well-developed, obese, 24 y.o., Black male in no acute distress.     Skin - warm, pink and dry without rash or concerning lesions.     Mental Status - alert and oriented x 3. Normal mood and affect appropriate to mood.     Mouth - pharynx is pink without exudates. Dentition is normal appearing. Tongue and uvula move in the midline. 1+ Tonsils bilaterally.    Neck - supple without lymphadenopathy. Carotid pulses are normal without bruits. Thyroid is normal in midline without nodules.     Chest - lungs are clear to auscultation without rales, rhonchi or wheezes.     Heart - regular, rate and rhythm without murmurs, rubs or gallops.    Abdomen - soft, moderately obese, protuberant, nontender, nondistended. No masses, hepatomegaly or splenomegaly is noted. No rebound, rigidity or guarding is noted. Bowel sounds are normoactive.     Extremities - no cyanosis, clubbing or edema. Pedal pulses are 2+ normal at the dorsalis pedis and posterior pulses bilaterally.     Neurological - cranial nerves II through XII are grossly intact. Motor strength 5/5 at all fours.     Lab Results   Component Value Date    ALBUMIN 4.6 12/20/2023    ALT 29 12/20/2023    AST 21 12/20/2023    BUN 14 12/20/2023    CALCIUM 9.6 12/20/2023     12/20/2023    CHOL 210 (H) 12/20/2023    CO2 26 12/20/2023    CREATININE 1.12 12/20/2023    EGFR >90 12/20/2023    GLUCOSE 73 (L) 12/20/2023    HDL 48.7 12/20/2023    HCT 43.5 12/20/2023    HGB 13.6 12/20/2023    K 4.2 12/20/2023    LDLCALC 146 (H) 12/20/2023     12/20/2023     12/20/2023    TRIG 79 12/20/2023    TSH 1.51 12/20/2023    WBC 4.6 12/20/2023 "     Assessment/Plan   1. Attention deficit hyperactivity disorder (ADHD), predominantly inattentive type        2. Bipolar disorder, mixed (Multi)  olanzapine-fluoxetine (Symbyax) 6-25 mg capsule      3. Fatigue, unspecified type  In-Center Sleep Study (Non-Sleep Provider Only)      4. Snoring  In-Center Sleep Study (Non-Sleep Provider Only)      5. Schizoaffective disorder, bipolar type (Multi)        6. Class 3 severe obesity due to excess calories without serious comorbidity with body mass index (BMI) of 40.0 to 44.9 in adult            Patient to continue current medications (with any exceptions as noted) and diet. Follow-up in 1 month(s), otherwise as needed.     Ordered in-Center Sleep Study (Non-Sleep Provider Only) to evaluate his snoring. Will call patient with results when available.     Patient was given refill(s) on:   Olanzapine-fluoxetine (Symbyax) 6-25 mg capsule, TAKE 1 CAPSULE BY MOUTH ONCE DAILY IN THE EVENING.     Rx(s) sent to pharmacy.       Encouraged patient to drink plenty of fluids, at least 64 oz of water daily.      Patient was advised to limit their salt intake and to not add any extra salt to their food. Patient is to avoid pretzels, chips, lunch meats, canned soups, soda pop, ham, peterson, hot dogs, etc.     Patient was encouraged to exercise regularly.    We will keep a close eye on his BP.    Scribe Attestation  By signing my name below, ISarah , Scribedna   attest that this documentation has been prepared under the direction and in the presence of Medhat Owens MD.

## 2024-10-16 ENCOUNTER — APPOINTMENT (OUTPATIENT)
Dept: PRIMARY CARE | Facility: CLINIC | Age: 24
End: 2024-10-16
Payer: COMMERCIAL

## 2024-10-16 VITALS
HEIGHT: 67 IN | BODY MASS INDEX: 42.22 KG/M2 | OXYGEN SATURATION: 96 % | SYSTOLIC BLOOD PRESSURE: 124 MMHG | TEMPERATURE: 97.2 F | HEART RATE: 103 BPM | DIASTOLIC BLOOD PRESSURE: 92 MMHG | WEIGHT: 269 LBS | RESPIRATION RATE: 16 BRPM

## 2024-10-16 DIAGNOSIS — R53.83 FATIGUE, UNSPECIFIED TYPE: ICD-10-CM

## 2024-10-16 DIAGNOSIS — E66.813 CLASS 3 SEVERE OBESITY DUE TO EXCESS CALORIES WITHOUT SERIOUS COMORBIDITY WITH BODY MASS INDEX (BMI) OF 40.0 TO 44.9 IN ADULT: ICD-10-CM

## 2024-10-16 DIAGNOSIS — F25.0 SCHIZOAFFECTIVE DISORDER, BIPOLAR TYPE (MULTI): ICD-10-CM

## 2024-10-16 DIAGNOSIS — F90.0 ATTENTION DEFICIT HYPERACTIVITY DISORDER (ADHD), PREDOMINANTLY INATTENTIVE TYPE: Primary | ICD-10-CM

## 2024-10-16 DIAGNOSIS — F31.60 BIPOLAR DISORDER, MIXED (MULTI): ICD-10-CM

## 2024-10-16 DIAGNOSIS — R06.83 SNORING: ICD-10-CM

## 2024-10-16 DIAGNOSIS — E66.01 CLASS 3 SEVERE OBESITY DUE TO EXCESS CALORIES WITHOUT SERIOUS COMORBIDITY WITH BODY MASS INDEX (BMI) OF 40.0 TO 44.9 IN ADULT: ICD-10-CM

## 2024-10-16 PROCEDURE — 3008F BODY MASS INDEX DOCD: CPT | Performed by: FAMILY MEDICINE

## 2024-10-16 PROCEDURE — 99213 OFFICE O/P EST LOW 20 MIN: CPT | Performed by: FAMILY MEDICINE

## 2024-10-16 PROCEDURE — 1036F TOBACCO NON-USER: CPT | Performed by: FAMILY MEDICINE

## 2024-10-16 RX ORDER — OLANZAPINE AND FLUOXETINE 6; 25 MG/1; MG/1
1 CAPSULE ORAL EVERY EVENING
Qty: 30 CAPSULE | Refills: 2 | Status: SHIPPED | OUTPATIENT
Start: 2024-10-16

## 2024-10-16 ASSESSMENT — PATIENT HEALTH QUESTIONNAIRE - PHQ9
1. LITTLE INTEREST OR PLEASURE IN DOING THINGS: NOT AT ALL
2. FEELING DOWN, DEPRESSED OR HOPELESS: NOT AT ALL
SUM OF ALL RESPONSES TO PHQ9 QUESTIONS 1 AND 2: 0

## 2024-10-25 DIAGNOSIS — F90.2 ATTENTION DEFICIT HYPERACTIVITY DISORDER (ADHD), COMBINED TYPE: Primary | ICD-10-CM

## 2024-10-28 RX ORDER — DEXTROAMPHETAMINE SACCHARATE, AMPHETAMINE ASPARTATE MONOHYDRATE, DEXTROAMPHETAMINE SULFATE, AMPHETAMINE SULFATE 6.25; 6.25; 6.25; 6.25 MG/1; MG/1; MG/1; MG/1
25 CAPSULE, EXTENDED RELEASE ORAL EVERY MORNING
Qty: 30 CAPSULE | Refills: 0 | OUTPATIENT
Start: 2024-10-28 | End: 2024-11-27

## 2024-11-04 DIAGNOSIS — F90.2 ATTENTION DEFICIT HYPERACTIVITY DISORDER (ADHD), COMBINED TYPE: ICD-10-CM

## 2024-11-04 RX ORDER — DEXTROAMPHETAMINE SACCHARATE, AMPHETAMINE ASPARTATE MONOHYDRATE, DEXTROAMPHETAMINE SULFATE, AMPHETAMINE SULFATE 6.25; 6.25; 6.25; 6.25 MG/1; MG/1; MG/1; MG/1
25 CAPSULE, EXTENDED RELEASE ORAL EVERY MORNING
Qty: 30 CAPSULE | Refills: 0 | Status: SHIPPED | OUTPATIENT
Start: 2024-11-04 | End: 2024-12-04

## 2024-11-04 NOTE — TELEPHONE ENCOUNTER
Rx Refill Request Telephone Encounter    Name:  Go Valdes II  :  [id number]  Medication Name:  amphetamine-dextroamphetamine XR (Mydayis) 25 mg   Specific Pharmacy location:  [facility]  Date of last appointment:  10/16  Date of next appointment:    Best number to reach patient:  [phone]

## 2024-11-21 ENCOUNTER — APPOINTMENT (OUTPATIENT)
Dept: PRIMARY CARE | Facility: CLINIC | Age: 24
End: 2024-11-21
Payer: COMMERCIAL

## 2024-12-06 DIAGNOSIS — F90.2 ATTENTION DEFICIT HYPERACTIVITY DISORDER (ADHD), COMBINED TYPE: ICD-10-CM

## 2024-12-06 NOTE — TELEPHONE ENCOUNTER
No UDS/CSA for medication ---     Name:  Go Valdes II  :  [id number]  Medication Name:  MYDAYIS 25MG  Specific Pharmacy location:  [facility]  Date of last appointment:  10-16-24  Date of next appointment:  NOT READY TO SCHEDULE   Best number to reach patient: [phone]

## 2024-12-07 RX ORDER — DEXTROAMPHETAMINE SACCHARATE, AMPHETAMINE ASPARTATE MONOHYDRATE, DEXTROAMPHETAMINE SULFATE, AMPHETAMINE SULFATE 6.25; 6.25; 6.25; 6.25 MG/1; MG/1; MG/1; MG/1
25 CAPSULE, EXTENDED RELEASE ORAL EVERY MORNING
Qty: 30 CAPSULE | Refills: 0 | Status: SHIPPED | OUTPATIENT
Start: 2024-12-07 | End: 2025-01-06

## 2025-01-06 DIAGNOSIS — F90.2 ATTENTION DEFICIT HYPERACTIVITY DISORDER (ADHD), COMBINED TYPE: ICD-10-CM

## 2025-01-06 RX ORDER — DEXTROAMPHETAMINE SACCHARATE, AMPHETAMINE ASPARTATE MONOHYDRATE, DEXTROAMPHETAMINE SULFATE, AMPHETAMINE SULFATE 6.25; 6.25; 6.25; 6.25 MG/1; MG/1; MG/1; MG/1
25 CAPSULE, EXTENDED RELEASE ORAL EVERY MORNING
Qty: 30 CAPSULE | Refills: 0 | Status: SHIPPED | OUTPATIENT
Start: 2025-01-06 | End: 2025-02-05

## 2025-01-06 NOTE — TELEPHONE ENCOUNTER
Rx Refill Request Telephone Encounter    Name:  Go Valdes II  :  [id number]  Medication Name:  amphetamine-dextroamphetamine XR (Mydayis) 25 mg 24 hr capsule     Specific Pharmacy location:  [facility]  Date of last appointment:  10/16/24  Date of next appointment:  N/A  Best number to reach patient:  [phone]

## 2025-01-20 ENCOUNTER — TELEPHONE (OUTPATIENT)
Dept: PRIMARY CARE | Facility: CLINIC | Age: 25
End: 2025-01-20
Payer: COMMERCIAL

## 2025-01-20 DIAGNOSIS — F31.60 BIPOLAR DISORDER, MIXED (MULTI): ICD-10-CM

## 2025-01-20 RX ORDER — OLANZAPINE AND FLUOXETINE 6; 25 MG/1; MG/1
1 CAPSULE ORAL EVERY EVENING
Qty: 30 CAPSULE | Refills: 2 | Status: SHIPPED | OUTPATIENT
Start: 2025-01-20

## 2025-01-20 NOTE — TELEPHONE ENCOUNTER
Rx Refill Request Telephone Encounter    Name:  Go Valdes II  :  [id number]  Medication Name:  olanzapine-fluoxetine (Symbyax) 6-25 mg   Specific Pharmacy location:  [facility] [address]   Date of last appointment:  10/16/2024  Date of next appointment:  NA  Best number to reach patient:  [phone]

## 2025-02-06 DIAGNOSIS — F90.2 ATTENTION DEFICIT HYPERACTIVITY DISORDER (ADHD), COMBINED TYPE: Primary | ICD-10-CM

## 2025-02-06 RX ORDER — DEXTROAMPHETAMINE SACCHARATE, AMPHETAMINE ASPARTATE MONOHYDRATE, DEXTROAMPHETAMINE SULFATE, AMPHETAMINE SULFATE 6.25; 6.25; 6.25; 6.25 MG/1; MG/1; MG/1; MG/1
25 CAPSULE, EXTENDED RELEASE ORAL EVERY MORNING
Qty: 30 CAPSULE | Refills: 0 | Status: SHIPPED | OUTPATIENT
Start: 2025-02-06 | End: 2025-03-08

## 2025-02-06 NOTE — TELEPHONE ENCOUNTER
Rx Refill Request     Name: Go Valdes II  :  2000   Medication Name:  MYDAIS 25MG  [facility]  Date of last appointment:  10/16/2024   Date of next appointment:  Visit date not found   Best number to reach patient:  [phone]

## 2025-03-06 DIAGNOSIS — F90.2 ATTENTION DEFICIT HYPERACTIVITY DISORDER (ADHD), COMBINED TYPE: Primary | ICD-10-CM

## 2025-03-06 NOTE — TELEPHONE ENCOUNTER
Rx Refill Request Telephone Encounter    Name:  Go Valdes II  :  [id number]  Medication Name:  amphetamine-dextroamphetamine XR (Mydayis) 25 mg 24 hr capsule     Specific Pharmacy location:  [facility]  Date of last appointment:  10/16/24  Date of next appointment:  n/a  Best number to reach patient:  [phone]

## 2025-03-07 RX ORDER — DEXTROAMPHETAMINE SACCHARATE, AMPHETAMINE ASPARTATE MONOHYDRATE, DEXTROAMPHETAMINE SULFATE, AMPHETAMINE SULFATE 6.25; 6.25; 6.25; 6.25 MG/1; MG/1; MG/1; MG/1
25 CAPSULE, EXTENDED RELEASE ORAL EVERY MORNING
Qty: 30 CAPSULE | Refills: 0 | Status: SHIPPED | OUTPATIENT
Start: 2025-03-08 | End: 2025-04-07

## 2025-04-07 DIAGNOSIS — F90.2 ATTENTION DEFICIT HYPERACTIVITY DISORDER (ADHD), COMBINED TYPE: ICD-10-CM

## 2025-04-07 NOTE — TELEPHONE ENCOUNTER
Rx Refill Request Telephone Encounter    Name:  Go Valdes II  :  [id number]  Medication Name:  amphetamine-dextroamphetamine XR (Mydayis) 25 mg 24 hr capsule     Specific Pharmacy location:  [facility]  Date of last appointment:  10/16/24  Date of next appointment:  N/A  Best number to reach patient:  [phone]

## 2025-04-08 RX ORDER — DEXTROAMPHETAMINE SACCHARATE, AMPHETAMINE ASPARTATE MONOHYDRATE, DEXTROAMPHETAMINE SULFATE, AMPHETAMINE SULFATE 6.25; 6.25; 6.25; 6.25 MG/1; MG/1; MG/1; MG/1
25 CAPSULE, EXTENDED RELEASE ORAL EVERY MORNING
Qty: 15 CAPSULE | Refills: 0 | Status: SHIPPED | OUTPATIENT
Start: 2025-04-08 | End: 2025-04-23

## 2025-04-14 ENCOUNTER — CLINICAL SUPPORT (OUTPATIENT)
Dept: SLEEP MEDICINE | Facility: HOSPITAL | Age: 25
End: 2025-04-14
Payer: COMMERCIAL

## 2025-04-14 DIAGNOSIS — G47.33 OBSTRUCTIVE SLEEP APNEA (ADULT) (PEDIATRIC): ICD-10-CM

## 2025-04-14 DIAGNOSIS — R06.83 SNORING: ICD-10-CM

## 2025-04-14 DIAGNOSIS — R53.83 FATIGUE, UNSPECIFIED TYPE: ICD-10-CM

## 2025-04-14 PROCEDURE — 95811 POLYSOM 6/>YRS CPAP 4/> PARM: CPT | Performed by: SPECIALIST

## 2025-04-15 VITALS — WEIGHT: 268.96 LBS | BODY MASS INDEX: 42.21 KG/M2 | HEIGHT: 67 IN

## 2025-04-15 ASSESSMENT — SLEEP AND FATIGUE QUESTIONNAIRES
HOW LIKELY ARE YOU TO NOD OFF OR FALL ASLEEP WHILE SITTING QUIETLY AFTER LUNCH WITHOUT ALCOHOL: HIGH CHANCE OF DOZING
HOW LIKELY ARE YOU TO NOD OFF OR FALL ASLEEP WHEN YOU ARE A PASSENGER IN A CAR FOR AN HOUR WITHOUT A BREAK: MODERATE CHANCE OF DOZING
HOW LIKELY ARE YOU TO NOD OFF OR FALL ASLEEP WHILE LYING DOWN TO REST IN THE AFTERNOON WHEN CIRCUMSTANCES PERMIT: HIGH CHANCE OF DOZING
ESS-CHAD TOTAL SCORE: 16
SITING INACTIVE IN A PUBLIC PLACE LIKE A CLASS ROOM OR A MOVIE THEATER: MODERATE CHANCE OF DOZING
HOW LIKELY ARE YOU TO NOD OFF OR FALL ASLEEP WHILE SITTING AND TALKING TO SOMEONE: WOULD NEVER DOZE
HOW LIKELY ARE YOU TO NOD OFF OR FALL ASLEEP IN A CAR, WHILE STOPPED FOR A FEW MINUTES IN TRAFFIC: WOULD NEVER DOZE
HOW LIKELY ARE YOU TO NOD OFF OR FALL ASLEEP WHILE WATCHING TV: HIGH CHANCE OF DOZING
HOW LIKELY ARE YOU TO NOD OFF OR FALL ASLEEP WHILE SITTING AND READING: HIGH CHANCE OF DOZING

## 2025-04-15 NOTE — PROGRESS NOTES
[facility] TECH NOTE:     Patient: Go Valdes II   MRN//AGE: [id number]  2000  24 y.o.   Technologist: Raquel Bartlett   Room: 404   Service Date: 4/15/2025        Sleep Testing [place]: [facility]    [place]: 16    TECHNOLOGIST SLEEP STUDY PROCEDURE NOTE:   This sleep study is being conducted according to the policies and procedures outlined by the AAS accreditation standards.  The sleep study procedure and processes involved during this appointment was explained to the patient/patient’s family, questions were answered. The patient/family verbalized understanding.      The patient is a 24 y.o. year old male scheduled for a Diagnostic PSG Split night with montage of:  PSG . he arrived for his appointment.      The study that was ultimately completed was a Diagnostic PSG Split night with montage of:  Split .    The full study Was completed.  Patient questionnaires completed?: yes     Consents signed? yes    Initial Fall Risk Screening:     Go has not fallen in the last 6 months. his fall did not result in injury. Go does not have a fear of falling. He does not need assistance with sitting, standing, or walking. he does not need assistance walking in his home. he does not need assistance in an unfamiliar setting. The patient is not using an assistive device.     Brief Study observations: The patient arrived for his ordered Diagnostic PSG. The possibility of a lab protocol Split was discussed with the patient and he verbalized understanding. The hook-up was done and the purpose of all sensors was explained. A normal sleep latency was seen. Once asleep, intermittent loud snoring was heard. Severe respiratory events were seen, so CPAP was initiated at 4 cm H2O (ResMed Airfit P10 Small Nasal Pillows). All sleep stages were seen. REM supine was captured. The patient was observed sleeping in supine, and right side positions. NSR was observed throughout the study.     Deviation to order/protocol and reason: Pt met  the lab protocol Split criteria      If PAP, which was preferred mask/pressure/mode: ResMed Airfit P10 Small Nasal Pillows, 13 cm H2O      Other: None    After the procedure, the patient/family was informed to ensure followup with ordering clinician for testing results.      Technologist: Raquel Bartlett

## 2025-04-20 DIAGNOSIS — G47.33 OSA ON CPAP: Primary | ICD-10-CM

## 2025-04-21 DIAGNOSIS — F31.60 BIPOLAR DISORDER, MIXED (MULTI): ICD-10-CM

## 2025-04-21 DIAGNOSIS — F90.2 ATTENTION DEFICIT HYPERACTIVITY DISORDER (ADHD), COMBINED TYPE: Primary | ICD-10-CM

## 2025-04-21 RX ORDER — DEXTROAMPHETAMINE SACCHARATE, AMPHETAMINE ASPARTATE MONOHYDRATE, DEXTROAMPHETAMINE SULFATE, AMPHETAMINE SULFATE 6.25; 6.25; 6.25; 6.25 MG/1; MG/1; MG/1; MG/1
25 CAPSULE, EXTENDED RELEASE ORAL EVERY MORNING
Qty: 30 CAPSULE | Refills: 0 | Status: SHIPPED | OUTPATIENT
Start: 2025-04-23 | End: 2025-05-23

## 2025-04-21 RX ORDER — OLANZAPINE AND FLUOXETINE 6; 25 MG/1; MG/1
1 CAPSULE ORAL EVERY EVENING
Qty: 30 CAPSULE | Refills: 2 | Status: SHIPPED | OUTPATIENT
Start: 2025-04-21

## 2025-04-21 NOTE — TELEPHONE ENCOUNTER
Rx Refill Request Telephone Encounter    Name:  Go Valdes II  :  [id number]  Medication Name:    olanzapine-fluoxetine (Symbyax) 6-25 mg capsule   amphetamine-dextroamphetamine XR (Mydayis) 25 mg 24 hr capsule - PLEASE NOTE - LAST RX WAS SHORT-TERM DUE TO BARRIOS OUT OF OFFICE    Specific Pharm[facility] location:  [facility]  Date of last appointment:  10/16/24  Date of next appointment:  25  Best number to reach patient:  [phone]

## 2025-05-06 ENCOUNTER — OFFICE VISIT (OUTPATIENT)
Dept: PRIMARY CARE | Facility: CLINIC | Age: 25
End: 2025-05-06
Payer: COMMERCIAL

## 2025-05-06 VITALS
BODY MASS INDEX: 45.67 KG/M2 | HEART RATE: 99 BPM | SYSTOLIC BLOOD PRESSURE: 122 MMHG | WEIGHT: 291 LBS | RESPIRATION RATE: 16 BRPM | TEMPERATURE: 97.7 F | HEIGHT: 67 IN | OXYGEN SATURATION: 97 % | DIASTOLIC BLOOD PRESSURE: 86 MMHG

## 2025-05-06 DIAGNOSIS — E88.810 SYNDROME X, METABOLIC: Primary | ICD-10-CM

## 2025-05-06 DIAGNOSIS — F31.60 BIPOLAR DISORDER, MIXED (MULTI): ICD-10-CM

## 2025-05-06 DIAGNOSIS — E66.813 CLASS 3 SEVERE OBESITY DUE TO EXCESS CALORIES WITHOUT SERIOUS COMORBIDITY WITH BODY MASS INDEX (BMI) OF 45.0 TO 49.9 IN ADULT: ICD-10-CM

## 2025-05-06 PROCEDURE — 3008F BODY MASS INDEX DOCD: CPT | Performed by: FAMILY MEDICINE

## 2025-05-06 PROCEDURE — 99214 OFFICE O/P EST MOD 30 MIN: CPT | Performed by: FAMILY MEDICINE

## 2025-05-06 PROCEDURE — 1036F TOBACCO NON-USER: CPT | Performed by: FAMILY MEDICINE

## 2025-05-06 RX ORDER — METFORMIN HYDROCHLORIDE 500 MG/1
500 TABLET, EXTENDED RELEASE ORAL EVERY MORNING
Qty: 30 TABLET | Refills: 5 | Status: SHIPPED | OUTPATIENT
Start: 2025-05-06 | End: 2025-11-02

## 2025-05-06 ASSESSMENT — ANXIETY QUESTIONNAIRES
7. FEELING AFRAID AS IF SOMETHING AWFUL MIGHT HAPPEN: SEVERAL DAYS
7. FEELING AFRAID AS IF SOMETHING AWFUL MIGHT HAPPEN: SEVERAL DAYS
IF YOU CHECKED OFF ANY PROBLEMS ON THIS QUESTIONNAIRE, HOW DIFFICULT HAVE THESE PROBLEMS MADE IT FOR YOU TO DO YOUR WORK, TAKE CARE OF THINGS AT HOME, OR GET ALONG WITH OTHER PEOPLE: VERY DIFFICULT
5. BEING SO RESTLESS THAT IT IS HARD TO SIT STILL: NOT AT ALL
2. NOT BEING ABLE TO STOP OR CONTROL WORRYING: MORE THAN HALF THE DAYS
1. FEELING NERVOUS, ANXIOUS, OR ON EDGE: MORE THAN HALF THE DAYS
2. NOT BEING ABLE TO STOP OR CONTROL WORRYING: MORE THAN HALF THE DAYS
3. WORRYING TOO MUCH ABOUT DIFFERENT THINGS: NEARLY EVERY DAY
6. BECOMING EASILY ANNOYED OR IRRITABLE: NEARLY EVERY DAY
GAD7 TOTAL SCORE: 14
6. BECOMING EASILY ANNOYED OR IRRITABLE: NEARLY EVERY DAY
GAD7 TOTAL SCORE: 14
4. TROUBLE RELAXING: NEARLY EVERY DAY
4. TROUBLE RELAXING: NEARLY EVERY DAY
IF YOU CHECKED OFF ANY PROBLEMS ON THIS QUESTIONNAIRE, HOW DIFFICULT HAVE THESE PROBLEMS MADE IT FOR YOU TO DO YOUR WORK, TAKE CARE OF THINGS AT HOME, OR GET ALONG WITH OTHER PEOPLE: VERY DIFFICULT
1. FEELING NERVOUS, ANXIOUS, OR ON EDGE: MORE THAN HALF THE DAYS
5. BEING SO RESTLESS THAT IT IS HARD TO SIT STILL: NOT AT ALL
3. WORRYING TOO MUCH ABOUT DIFFERENT THINGS: NEARLY EVERY DAY

## 2025-05-06 ASSESSMENT — ENCOUNTER SYMPTOMS
OCCASIONAL FEELINGS OF UNSTEADINESS: 0
LOSS OF SENSATION IN FEET: 0
DEPRESSION: 0

## 2025-05-06 ASSESSMENT — PATIENT HEALTH QUESTIONNAIRE - PHQ9
6. FEELING BAD ABOUT YOURSELF - OR THAT YOU ARE A FAILURE OR HAVE LET YOURSELF OR YOUR FAMILY DOWN: MORE THAN HALF THE DAYS
SUM OF ALL RESPONSES TO PHQ9 QUESTIONS 1 AND 2: 3
SUM OF ALL RESPONSES TO PHQ QUESTIONS 1-9: 16
10. IF YOU CHECKED OFF ANY PROBLEMS, HOW DIFFICULT HAVE THESE PROBLEMS MADE IT FOR YOU TO DO YOUR WORK, TAKE CARE OF THINGS AT HOME, OR GET ALONG WITH OTHER PEOPLE: VERY DIFFICULT
8. MOVING OR SPEAKING SO SLOWLY THAT OTHER PEOPLE COULD HAVE NOTICED. OR THE OPPOSITE, BEING SO FIGETY OR RESTLESS THAT YOU HAVE BEEN MOVING AROUND A LOT MORE THAN USUAL: NOT AT ALL
2. FEELING DOWN, DEPRESSED OR HOPELESS: SEVERAL DAYS
7. TROUBLE CONCENTRATING ON THINGS, SUCH AS READING THE NEWSPAPER OR WATCHING TELEVISION: SEVERAL DAYS
9. THOUGHTS THAT YOU WOULD BE BETTER OFF DEAD, OR OF HURTING YOURSELF: SEVERAL DAYS
3. TROUBLE FALLING OR STAYING ASLEEP OR SLEEPING TOO MUCH: NEARLY EVERY DAY
4. FEELING TIRED OR HAVING LITTLE ENERGY: NEARLY EVERY DAY
5. POOR APPETITE OR OVEREATING: NEARLY EVERY DAY
1. LITTLE INTEREST OR PLEASURE IN DOING THINGS: MORE THAN HALF THE DAYS

## 2025-05-06 NOTE — PROGRESS NOTES
Subjective   Patient ID: Go Valdes II is a 24 y.o. male who presents for Follow Up on Sleep Study results. I last saw the patient on 10/16/2024.     HPI  Patient presents today to follow up on Sleep Study Results.     He would like to also like to adjust his medications depending on results. Wants to discuss further with PCP. Patient has done some research regarding his medications and wanted my opinion to change his medications regarding his weight loss.    Patient is trying to exercise and watching his diet.    Patient has gained around 25 pounds from his last visit.    Past medical, surgical, and family history reviewed.  Reviewed and documented all medications   Pt eating well, exercising as tolerated and taking medications as directed.      Review of Systems  Except positives as noted in the CC & HPI      Constitutional: Denies fevers, chills, night sweats, fatigue, weight changes, change in appetite    Eyes: Denies blurry vision, double vision    ENT: Denies otalgia, trouble hearing, tinnitus, vertigo, nasal congestion, rhinorrhea, sore throat    Neck: Denies swelling, masses    Cardiovascular: Denies chest pain, palpitations, edema, orthopnea, syncope    Respiratory: Denies dyspnea, cough, wheezing, postural nocturnal dyspnea    Gastrointestinal: Denies abdominal pain, nausea, vomiting, diarrhea, constipation, melena, hematochezia    Genitourinary: Denies dysuria, hematuria  Musculoskeletal: Denies back pain, neck pain, arthralgias, myalgias    Integumentary: Denies skin lesions, rashes, masses    Neurological: Denies dizziness, headaches, confusion, limb weakness, paresthesias, syncope, convulsions    Psychiatric: Denies depression, anxiety, homicidal ideations, suicidal ideations, sleep disturbances    Endocrine: Denies polyphagia, polydipsia, polyuria, weakness, hair thinning, heat intolerance, cold intolerance, weight changes    Heme/Lymph: Denies easy bruising, easy bleeding, swollen  "glands    Objective   Visit Vitals  /86 (BP Location: Right arm, Patient Position: Sitting, BP Cuff Size: Large adult)   Pulse 99   Temp 36.5 °C (97.7 °F) (Temporal)   Resp 16   Ht 1.702 m (5' 7\")   Wt 132 kg (291 lb)   SpO2 97%   BMI 45.58 kg/m²   Smoking Status Never   BSA 2.5 m²       Physical Exam    General Appearance - well-developed, obese, 24 y.o., Black male in no acute distress.      Skin - warm, pink and dry without rash or concerning lesions.      Mental Status - alert and oriented x 3. Normal mood and affect appropriate to mood.      Mouth - pharynx is pink without exudates. Dentition is normal appearing. Tongue and uvula move in the midline. 1+ Tonsils bilaterally.     Neck - supple without lymphadenopathy. Carotid pulses are normal without bruits. Thyroid is normal in midline without nodules.      Chest - lungs are clear to auscultation without rales, rhonchi or wheezes.      Heart - regular, rate and rhythm without murmurs, rubs or gallops.     Abdomen - soft, moderately obese, protuberant, nontender, nondistended. No masses, hepatomegaly or splenomegaly is noted. No rebound, rigidity or guarding is noted. Bowel sounds are normoactive.      Extremities - no cyanosis, clubbing or edema. Pedal pulses are 2+ normal at the dorsalis pedis and posterior pulses bilaterally.      Neurological - cranial nerves II through XII are grossly intact. Motor strength 5/5 at all fours.    Assessment   1. Syndrome X, metabolic  metFORMIN  mg 24 hr tablet      2. Bipolar disorder, mixed (Multi)        3. Class 3 severe obesity due to excess calories without serious comorbidity with body mass index (BMI) of 45.0 to 49.9 in adult          For weight management I recommend exercising and remaining physically active. Try to maintain a heathy diet that includes green leafy vegetables, fruits and proteins. Patient was encouraged to stay well hydrated.     Patient will continue on a diabetic, low-cholesterol diet " and weight reduction. Exercise as tolerated. He will continue medications as prescribed. Follow-up in 1 month(s) otherwise as needed.      Patient was started on:   Metformin HCl  mg, TAKE 1 TAB BY MOUTH DAILY WITH FOOD    Rx(s) sent to pharmacy.      Patient will start auto pap when he gets his machine that should possibly help with his weight as well.    Consider changing his Olanzapine depending on his progress with weight loss.    Scribe Attestation  By signing my name below, I, Sarah Rocha , Scribe   attest that this documentation has been prepared under the direction and in the presence of Dre Owens MD.      This note has been transcribed using a medical scribe and there is a possibility of unintentional typing misprints.     All medical record entries made by the scribe were at my direction and personally dictated by me. I have reviewed the chart and agree that the record accurately reflects my personal performance of the history, physical exam, discussion, and plan.     DRE OWENS M.D.

## 2025-05-14 ENCOUNTER — APPOINTMENT (OUTPATIENT)
Dept: PRIMARY CARE | Facility: CLINIC | Age: 25
End: 2025-05-14
Payer: COMMERCIAL

## 2025-05-19 ENCOUNTER — TELEPHONE (OUTPATIENT)
Dept: PRIMARY CARE | Facility: CLINIC | Age: 25
End: 2025-05-19
Payer: COMMERCIAL

## 2025-05-28 DIAGNOSIS — F90.2 ATTENTION DEFICIT HYPERACTIVITY DISORDER (ADHD), COMBINED TYPE: ICD-10-CM

## 2025-05-28 RX ORDER — DEXTROAMPHETAMINE SACCHARATE, AMPHETAMINE ASPARTATE MONOHYDRATE, DEXTROAMPHETAMINE SULFATE, AMPHETAMINE SULFATE 6.25; 6.25; 6.25; 6.25 MG/1; MG/1; MG/1; MG/1
25 CAPSULE, EXTENDED RELEASE ORAL EVERY MORNING
Qty: 30 CAPSULE | Refills: 0 | Status: SHIPPED | OUTPATIENT
Start: 2025-05-28 | End: 2025-06-27

## 2025-05-28 NOTE — TELEPHONE ENCOUNTER
Rx Refill Request Telephone Encounter    Name:  Go Valdes II  :  [id number]  Medication Name:  amphetamine-dextroamphetamine XR (Mydayis) 25 mg 24 hr capsule  Specific Pharmacy location:  [facility] [address]   Date of last appointment:    Date of next appointment:    Best number to reach patient:  [phone]

## 2025-06-11 ENCOUNTER — APPOINTMENT (OUTPATIENT)
Dept: PRIMARY CARE | Facility: CLINIC | Age: 25
End: 2025-06-11
Payer: COMMERCIAL

## 2025-06-11 VITALS
WEIGHT: 290.6 LBS | HEART RATE: 107 BPM | HEIGHT: 67 IN | BODY MASS INDEX: 45.61 KG/M2 | OXYGEN SATURATION: 97 % | SYSTOLIC BLOOD PRESSURE: 132 MMHG | DIASTOLIC BLOOD PRESSURE: 68 MMHG

## 2025-06-11 DIAGNOSIS — F32.1 CURRENT MODERATE EPISODE OF MAJOR DEPRESSIVE DISORDER WITHOUT PRIOR EPISODE (MULTI): ICD-10-CM

## 2025-06-11 DIAGNOSIS — F25.0 SCHIZOAFFECTIVE DISORDER, BIPOLAR TYPE (MULTI): ICD-10-CM

## 2025-06-11 DIAGNOSIS — E66.813 CLASS 3 SEVERE OBESITY DUE TO EXCESS CALORIES WITHOUT SERIOUS COMORBIDITY WITH BODY MASS INDEX (BMI) OF 45.0 TO 49.9 IN ADULT: Primary | ICD-10-CM

## 2025-06-11 DIAGNOSIS — F90.0 ATTENTION DEFICIT HYPERACTIVITY DISORDER (ADHD), PREDOMINANTLY INATTENTIVE TYPE: ICD-10-CM

## 2025-06-11 PROBLEM — F25.9 SCHIZOAFFECTIVE DISORDER (MULTI): Status: RESOLVED | Noted: 2023-02-15 | Resolved: 2025-06-11

## 2025-06-11 PROCEDURE — 99213 OFFICE O/P EST LOW 20 MIN: CPT | Performed by: FAMILY MEDICINE

## 2025-06-11 PROCEDURE — 1036F TOBACCO NON-USER: CPT | Performed by: FAMILY MEDICINE

## 2025-06-11 PROCEDURE — 3008F BODY MASS INDEX DOCD: CPT | Performed by: FAMILY MEDICINE

## 2025-06-11 NOTE — PROGRESS NOTES
Chief Complaint   Patient presents with    ADHD       HPI: Go Valdes II is a 25 y.o. male presenting for follow-up of ADHD  . I last saw the patient 5/6/2025.     Patient is doing well on his current medications. The metformin does not seem to do anything to his appetite.he would like to try Contrave after doing some research before starting with the injectables GLP1s.    Past medical, surgical, and family history reviewed.  Reviewed and documented all medications   Pt eating well, exercising as tolerated and taking medications as directed.        ROS    Except positives as noted in the CC & HPI   Constitutional: Denies fevers, chills, night sweats, fatigue, weight changes, change in appetite   Eyes: Denies blurry vision, double vision   ENT: Denies otalgia, trouble hearing, tinnitus, vertigo, nasal congestion, rhinorrhea, sore throat   Neck: Denies swelling, masses   Cardiovascular: Denies chest pain, palpitations, edema, orthopnea, syncope   Respiratory: Denies dyspnea, cough, wheezing, postural nocturnal dyspnea   Gastrointestinal: Denies abdominal pain, nausea, vomiting, diarrhea, constipation, melena, hematochezia   Genitourinary: Denies dysuria, hematuria, frequency, urgency   Musculoskeletal: Denies back pain, neck pain, arthralgias, myalgias   Integumentary: Denies skin lesions, rashes, masses   Neurological: Denies dizziness, headaches, confusion, limb weakness, paresthesias, syncope, convulsions   Psychiatric: Denies depression, anxiety, homicidal ideations, suicidal ideations, sleep disturbances   Endocrine: Denies polyphagia, polydipsia, polyuria, weakness, hair thinning, heat intolerance, cold intolerance, weight changes   Heme/Lymph: Denies easy bruising, easy bleeding, swollen glands     Vitals:    06/11/25 0906 06/11/25 0942   BP: 129/85 132/68   BP Location: Left arm Right arm   Patient Position:  Sitting   BP Cuff Size: Large adult long Large adult   Pulse: 107    SpO2: 97%    Weight: 132 kg  "(290 lb 9.6 oz)    Height: 1.702 m (5' 7\")        PHYSICAL EXAM  General Appearance - well-developed, obese, 25 y.o., Black male in no acute distress.      Skin - warm, pink and dry without rash or concerning lesions.      Mental Status - alert and oriented x 3. Normal mood and affect appropriate to mood.      Neck - supple without lymphadenopathy. Carotid pulses are normal without bruits. Thyroid is normal in midline without nodules.      Chest - lungs are clear to auscultation without rales, rhonchi or wheezes.      Heart - regular, rate and rhythm without murmurs, rubs or gallops.     Abdomen - soft, moderately obese, protuberant, nontender, nondistended. No masses, hepatomegaly or splenomegaly is noted. No rebound, rigidity or guarding is noted. Bowel sounds are normoactive.      Extremities - no cyanosis, clubbing or edema. Pedal pulses are 2+ normal at the dorsalis pedis and posterior pulses bilaterally.      Neurological - cranial nerves II through XII are grossly intact. Motor strength 5/5 at all fours.    Below is the patient's most recent value for Albumin, ALT, AST, BUN, Calcium, Chloride, Cholesterol, CO2, Creatinine, GFR, Glucose, HDL, Hematocrit, Hemoglobin, Hemoglobin A1C, LDL, Magnesium, Phosphorus, Platelets, Potassium, PSA, Sodium, Triglycerides, and WBC.   Lab Results   Component Value Date    ALBUMIN 4.6 12/20/2023    ALT 29 12/20/2023    AST 21 12/20/2023    BUN 14 12/20/2023    CALCIUM 9.6 12/20/2023     12/20/2023    CHOL 210 (H) 12/20/2023    CO2 26 12/20/2023    CREATININE 1.12 12/20/2023    HDL 48.7 12/20/2023    HCT 43.5 12/20/2023    HGB 13.6 12/20/2023     12/20/2023    K 4.2 12/20/2023     12/20/2023    TRIG 79 12/20/2023    WBC 4.6 12/20/2023         ASSESSMENT:  1. Class 3 severe obesity due to excess calories without serious comorbidity with body mass index (BMI) of 45.0 to 49.9 in adult  naltrexone-bupropion (Contrave) 8-90 mg ER tablet      2. Current moderate " episode of major depressive disorder without prior episode (Multi)        3. Attention deficit hyperactivity disorder (ADHD), predominantly inattentive type        4. Schizoaffective disorder, bipolar type (Multi)            PLAN:  For weight management I recommend exercising and remaining physically active. Try to maintain a heathy diet that includes green leafy vegetables, fruits and proteins. Patient was encouraged to stay well hydrated.     Patient will continue on a diabetic, low-cholesterol diet and weight reduction. Exercise as tolerated. He will continue medications as prescribed. Follow-up in 1 month(s) otherwise as needed.      Patient was started on:   Naltrexone-bupropion (Contrave) 8-90 mg ER tablet, 1 TABLET QAM DAYS 1-7, THEN 1 TAB BID DAYS 8-14, THEN 2 TABS IN AM + 1 TAB IN PM DAYS 15-22, THEN 2 TABS BID.     Rx(s) sent to pharmacy.      Patient will start his CPAP machine when he receives it with in the next week.    Consider increasing the dose of his metformin XR to two tablets if Contrave is not an option for him due to cost.    Scribe Attestation  By signing my name below, ISarah , Bobbi   attest that this documentation has been prepared under the direction and in the presence of Dre Owens MD.      This note has been transcribed using a medical scribe and there is a possibility of unintentional typing misprints.     All medical record entries made by the scribe were at my direction and personally dictated by me. I have reviewed the chart and agree that the record accurately reflects my personal performance of the history, physical exam, discussion, and plan.     DRE OWENS M.D.

## 2025-06-12 NOTE — ASSESSMENT & PLAN NOTE
Schizoaffective disorder, bipolar type is stable.  Patient to continue with current medications and treatment plan.  Follow-up at least annually.

## 2025-07-01 DIAGNOSIS — F90.2 ATTENTION DEFICIT HYPERACTIVITY DISORDER (ADHD), COMBINED TYPE: Primary | ICD-10-CM

## 2025-07-01 RX ORDER — DEXTROAMPHETAMINE SACCHARATE, AMPHETAMINE ASPARTATE MONOHYDRATE, DEXTROAMPHETAMINE SULFATE, AMPHETAMINE SULFATE 6.25; 6.25; 6.25; 6.25 MG/1; MG/1; MG/1; MG/1
25 CAPSULE, EXTENDED RELEASE ORAL EVERY MORNING
Qty: 30 CAPSULE | Refills: 0 | Status: SHIPPED | OUTPATIENT
Start: 2025-07-01 | End: 2025-07-31

## 2025-07-01 NOTE — TELEPHONE ENCOUNTER
Rx Refill Request Telephone Encounter    Name:  Go Valdes II  :  [id number]  Medication Name:  amphetamine-dextroamphetamine XR (Mydayis) 25 mg  Specific Pharmacy location:  [facility] [address]   Date of last appointment:    Date of next appointment:  7/15  Best number to reach patient:  [phone]

## 2025-07-02 ENCOUNTER — TELEPHONE (OUTPATIENT)
Dept: PRIMARY CARE | Facility: CLINIC | Age: 25
End: 2025-07-02

## 2025-07-02 NOTE — TELEPHONE ENCOUNTER
We received a request for prior authorization on the patient's amphetamine-dextroamphetamine XR (Mydayis) 25 mg 24 hr capsule  from their pharmacy. Prior authorization was submitted to insurance today. We will await their determination.

## 2025-07-15 ENCOUNTER — APPOINTMENT (OUTPATIENT)
Dept: PRIMARY CARE | Facility: CLINIC | Age: 25
End: 2025-07-15
Payer: COMMERCIAL

## 2025-07-15 VITALS
WEIGHT: 293 LBS | SYSTOLIC BLOOD PRESSURE: 126 MMHG | TEMPERATURE: 97.7 F | HEIGHT: 67 IN | BODY MASS INDEX: 45.99 KG/M2 | HEART RATE: 89 BPM | DIASTOLIC BLOOD PRESSURE: 82 MMHG | RESPIRATION RATE: 16 BRPM | OXYGEN SATURATION: 98 %

## 2025-07-15 DIAGNOSIS — F25.0 SCHIZOAFFECTIVE DISORDER, BIPOLAR TYPE (MULTI): ICD-10-CM

## 2025-07-15 DIAGNOSIS — Z13.220 LIPID SCREENING: ICD-10-CM

## 2025-07-15 DIAGNOSIS — E66.813 CLASS 3 SEVERE OBESITY DUE TO EXCESS CALORIES WITHOUT SERIOUS COMORBIDITY WITH BODY MASS INDEX (BMI) OF 45.0 TO 49.9 IN ADULT: ICD-10-CM

## 2025-07-15 DIAGNOSIS — F32.1 CURRENT MODERATE EPISODE OF MAJOR DEPRESSIVE DISORDER WITHOUT PRIOR EPISODE (MULTI): ICD-10-CM

## 2025-07-15 DIAGNOSIS — F31.60 BIPOLAR DISORDER, MIXED (MULTI): ICD-10-CM

## 2025-07-15 DIAGNOSIS — F90.0 ATTENTION DEFICIT HYPERACTIVITY DISORDER (ADHD), PREDOMINANTLY INATTENTIVE TYPE: Primary | ICD-10-CM

## 2025-07-15 DIAGNOSIS — F90.2 ATTENTION DEFICIT HYPERACTIVITY DISORDER (ADHD), COMBINED TYPE: ICD-10-CM

## 2025-07-15 LAB
POC AMPHETAMINE: POSITIVE NG/ML
POC BARBITURATES: NEGATIVE NG/ML
POC BENZODIAZEPINES: NEGATIVE NG/ML
POC BUPRENORPHINE URINE: NEGATIVE NG/ML
POC COCAINE: NEGATIVE NG/ML
POC MDMA (NG/ML) IN URINE: NEGATIVE NG/ML
POC METHADONE MANUALLY ENTERED: NEGATIVE NG/ML
POC METHAMPHETAMINE: NEGATIVE NG/ML
POC MORPHINE URINE: NEGATIVE NG/ML
POC OPIATES: NEGATIVE NG/ML
POC OXYCODONE: NEGATIVE NG/ML
POC PHENCYCLIDINE (PCP): NEGATIVE NG/ML
POC THC: NEGATIVE NG/ML
POC TICYCLIC ANTIDEPRESSANTS (TCA): NEGATIVE NG/ML

## 2025-07-15 PROCEDURE — 1036F TOBACCO NON-USER: CPT | Performed by: FAMILY MEDICINE

## 2025-07-15 PROCEDURE — 80305 DRUG TEST PRSMV DIR OPT OBS: CPT | Performed by: FAMILY MEDICINE

## 2025-07-15 PROCEDURE — 3008F BODY MASS INDEX DOCD: CPT | Performed by: FAMILY MEDICINE

## 2025-07-15 PROCEDURE — 99213 OFFICE O/P EST LOW 20 MIN: CPT | Performed by: FAMILY MEDICINE

## 2025-07-15 RX ORDER — DEXTROAMPHETAMINE SACCHARATE, AMPHETAMINE ASPARTATE MONOHYDRATE, DEXTROAMPHETAMINE SULFATE, AMPHETAMINE SULFATE 6.25; 6.25; 6.25; 6.25 MG/1; MG/1; MG/1; MG/1
25 CAPSULE, EXTENDED RELEASE ORAL EVERY MORNING
Qty: 30 CAPSULE | Refills: 0 | Status: CANCELLED | OUTPATIENT
Start: 2025-07-15 | End: 2025-08-14

## 2025-07-15 RX ORDER — OLANZAPINE AND FLUOXETINE 6; 25 MG/1; MG/1
1 CAPSULE ORAL EVERY EVENING
Qty: 30 CAPSULE | Refills: 11 | Status: SHIPPED | OUTPATIENT
Start: 2025-07-15 | End: 2026-07-15

## 2025-07-15 ASSESSMENT — PATIENT HEALTH QUESTIONNAIRE - PHQ9
5. POOR APPETITE OR OVEREATING: SEVERAL DAYS
4. FEELING TIRED OR HAVING LITTLE ENERGY: NEARLY EVERY DAY
6. FEELING BAD ABOUT YOURSELF - OR THAT YOU ARE A FAILURE OR HAVE LET YOURSELF OR YOUR FAMILY DOWN: MORE THAN HALF THE DAYS
SUM OF ALL RESPONSES TO PHQ QUESTIONS 1-9: 16
1. LITTLE INTEREST OR PLEASURE IN DOING THINGS: MORE THAN HALF THE DAYS
7. TROUBLE CONCENTRATING ON THINGS, SUCH AS READING THE NEWSPAPER OR WATCHING TELEVISION: MORE THAN HALF THE DAYS
9. THOUGHTS THAT YOU WOULD BE BETTER OFF DEAD, OR OF HURTING YOURSELF: SEVERAL DAYS
2. FEELING DOWN, DEPRESSED OR HOPELESS: SEVERAL DAYS
SUM OF ALL RESPONSES TO PHQ9 QUESTIONS 1 AND 2: 3
8. MOVING OR SPEAKING SO SLOWLY THAT OTHER PEOPLE COULD HAVE NOTICED. OR THE OPPOSITE, BEING SO FIGETY OR RESTLESS THAT YOU HAVE BEEN MOVING AROUND A LOT MORE THAN USUAL: MORE THAN HALF THE DAYS
3. TROUBLE FALLING OR STAYING ASLEEP OR SLEEPING TOO MUCH: MORE THAN HALF THE DAYS

## 2025-07-15 ASSESSMENT — ANXIETY QUESTIONNAIRES
2. NOT BEING ABLE TO STOP OR CONTROL WORRYING: MORE THAN HALF THE DAYS
1. FEELING NERVOUS, ANXIOUS, OR ON EDGE: SEVERAL DAYS
GAD7 TOTAL SCORE: 15
4. TROUBLE RELAXING: NEARLY EVERY DAY
7. FEELING AFRAID AS IF SOMETHING AWFUL MIGHT HAPPEN: MORE THAN HALF THE DAYS
5. BEING SO RESTLESS THAT IT IS HARD TO SIT STILL: NEARLY EVERY DAY
3. WORRYING TOO MUCH ABOUT DIFFERENT THINGS: MORE THAN HALF THE DAYS
6. BECOMING EASILY ANNOYED OR IRRITABLE: MORE THAN HALF THE DAYS
IF YOU CHECKED OFF ANY PROBLEMS ON THIS QUESTIONNAIRE, HOW DIFFICULT HAVE THESE PROBLEMS MADE IT FOR YOU TO DO YOUR WORK, TAKE CARE OF THINGS AT HOME, OR GET ALONG WITH OTHER PEOPLE: SOMEWHAT DIFFICULT

## 2025-07-15 NOTE — PROGRESS NOTES
Subjective   Patient ID: Go Valdes II is a 25 y.o. male who presents for Follow-Up on ADHD,. Depression, Bipolar disorder and Schizoaffective disorder. I last saw the patient on 6/11/2025.     HPI  Patient was started on Contrave for appetite suppression, insurance did not cover medication so patient did not start medication    Patient states that he would like his ADHD medication adjusted. States that he would like it to be more effective.     Patient requests for a refill of his bipolar medication.    Patient admits that he could be doing a better job watching his diet and going to the gym. He has not considered gastric bypass.    Patient does feel that the Metformin has decreased his appetite.    Past medical, surgical, and family history reviewed.  Reviewed and documented all medications   Pt eating well, exercising as tolerated and taking medications as directed.      Review of Systems  Except positives as noted in the CC & HPI      Constitutional: Denies fevers, chills, night sweats, fatigue, weight changes, change in appetite    Eyes: Denies blurry vision, double vision    ENT: Denies otalgia, trouble hearing, tinnitus, vertigo, nasal congestion, rhinorrhea, sore throat    Neck: Denies swelling, masses    Cardiovascular: Denies chest pain, palpitations, edema, orthopnea, syncope    Respiratory: Denies dyspnea, cough, wheezing, postural nocturnal dyspnea    Gastrointestinal: Denies abdominal pain, nausea, vomiting, diarrhea, constipation, melena, hematochezia    Genitourinary: Denies dysuria, hematuria  Musculoskeletal: Denies back pain, neck pain, arthralgias, myalgias    Integumentary: Denies skin lesions, rashes, masses    Neurological: Denies dizziness, headaches, confusion, limb weakness, paresthesias, syncope, convulsions    Psychiatric: Denies depression, anxiety, homicidal ideations, suicidal ideations, sleep disturbances    Endocrine: Denies polyphagia, polydipsia, polyuria, weakness, hair  "thinning, heat intolerance, cold intolerance, weight changes    Heme/Lymph: Denies easy bruising, easy bleeding, swollen glands    Objective   Visit Vitals  /82 (BP Location: Right arm, Patient Position: Sitting, BP Cuff Size: Large adult)   Pulse 89   Temp 36.5 °C (97.7 °F)   Resp 16   Ht 1.702 m (5' 7\")   Wt 133 kg (293 lb)   SpO2 98%   BMI 45.89 kg/m²   Smoking Status Never   BSA 2.51 m²       Physical Exam    General Appearance - well-developed, obese, 25 y.o., Black male in no acute distress.      Skin - warm, pink and dry without rash or concerning lesions.      Mental Status - alert and oriented x 3. Normal mood and affect appropriate to mood.      Neck - supple without lymphadenopathy. Carotid pulses are normal without bruits. Thyroid is normal in midline without nodules.      Chest - lungs are clear to auscultation without rales, rhonchi or wheezes.      Heart - regular, rate and rhythm without murmurs, rubs or gallops.     Abdomen - soft, moderately obese, protuberant, nontender, nondistended. No masses, hepatomegaly or splenomegaly is noted. No rebound, rigidity or guarding is noted. Bowel sounds are normoactive.      Extremities - no cyanosis, clubbing or edema. Pedal pulses are 2+ normal at the dorsalis pedis and posterior pulses bilaterally.      Neurological - cranial nerves II through XII are grossly intact. Motor strength 5/5 at all fours.    Assessment   1. Attention deficit hyperactivity disorder (ADHD), predominantly inattentive type  CBC and Auto Differential    Comprehensive Metabolic Panel    POCT waived urine drug screen manually resulted    CBC and Auto Differential    Comprehensive Metabolic Panel      2. Schizoaffective disorder, bipolar type (Multi)        3. Bipolar disorder, mixed (Multi)  olanzapine-fluoxetine (Symbyax) 6-25 mg capsule      4. Attention deficit hyperactivity disorder (ADHD), combined type        5. Current moderate episode of major depressive disorder without " prior episode (Multi)        6. Class 3 severe obesity due to excess calories without serious comorbidity with body mass index (BMI) of 45.0 to 49.9 in adult  TSH with reflex to Free T4 if abnormal    TSH with reflex to Free T4 if abnormal      7. Lipid screening  Lipid Panel    Lipid Panel        For weight management I recommend exercising and remaining physically active. Try to maintain a heathy diet that includes green leafy vegetables, fruits and proteins. Patient was encouraged to stay well hydrated.      Patient will continue on a diabetic, low-cholesterol diet and weight reduction. Exercise as tolerated. He will continue medications as prescribed. Follow-up in 3 month(s) otherwise as needed.     Patient is to return for fasting CBC and Auto Differential, Comprehensive Metabolic Panel, Lipid profile, TSH with reflex free T4 if abnormal labs at their convenience prior to their next appointment. Fasting is nothing to eat or drink except water or black coffee for 8-12 hours. Will call patient with results when available.       Increase his to Mydayis from 25 mg to 37.5 mg with his next refill.    Patient was given refill(s) on:   Olanzapine-fluoxetine (Symbyax) 6-25 mg capsule, TAKE 1 CAPSULE BY MOUTH ONCE DAILY IN THE EVENING.     Rx(s) sent to pharmacy.      POCT Urine is collected for drug screen. Patient signed the CSA today.     Scribe Attestation  By signing my name below, I, Bobbi Barbosa   attest that this documentation has been prepared under the direction and in the presence of Dre Owens MD.      This note has been transcribed using a medical scribe and there is a possibility of unintentional typing misprints.     All medical record entries made by the scribe were at my direction and personally dictated by me. I have reviewed the chart and agree that the record accurately reflects my personal performance of the history, physical exam, discussion, and plan.     DRE OWENS M.D.

## 2025-07-22 ENCOUNTER — TELEPHONE (OUTPATIENT)
Dept: PRIMARY CARE | Facility: CLINIC | Age: 25
End: 2025-07-22
Payer: COMMERCIAL

## 2025-07-22 NOTE — TELEPHONE ENCOUNTER
Patient called in stating he is needing a PA on his olanzapine-fluoxetine. He is requesting this be started   Please Advise

## 2025-08-04 DIAGNOSIS — F90.2 ATTENTION DEFICIT HYPERACTIVITY DISORDER (ADHD), COMBINED TYPE: Primary | ICD-10-CM

## 2025-08-04 RX ORDER — DEXTROAMPHETAMINE SACCHARATE, AMPHETAMINE ASPARTATE MONOHYDRATE, DEXTROAMPHETAMINE SULFATE, AMPHETAMINE SULFATE 6.25; 6.25; 6.25; 6.25 MG/1; MG/1; MG/1; MG/1
25 CAPSULE, EXTENDED RELEASE ORAL EVERY MORNING
Qty: 15 CAPSULE | Refills: 0 | Status: SHIPPED | OUTPATIENT
Start: 2025-08-04 | End: 2025-08-19

## 2025-08-04 NOTE — TELEPHONE ENCOUNTER
Patient called in requesting medication refill for   Mydayis 37.5mg   Patient stated that he discussed this increase in dosage in last office visit   Please advise   Lov 7/15/25  Nov 10/15/25

## 2025-08-07 LAB
ALBUMIN SERPL-MCNC: 4.6 G/DL (ref 3.6–5.1)
ALP SERPL-CCNC: 47 U/L (ref 36–130)
ALT SERPL-CCNC: 41 U/L (ref 9–46)
ANION GAP SERPL CALCULATED.4IONS-SCNC: 9 MMOL/L (CALC) (ref 7–17)
AST SERPL-CCNC: 18 U/L (ref 10–40)
BASOPHILS # BLD AUTO: 50 CELLS/UL (ref 0–200)
BASOPHILS NFR BLD AUTO: 0.9 %
BILIRUB SERPL-MCNC: 0.4 MG/DL (ref 0.2–1.2)
BUN SERPL-MCNC: 11 MG/DL (ref 7–25)
CALCIUM SERPL-MCNC: 9.4 MG/DL (ref 8.6–10.3)
CHLORIDE SERPL-SCNC: 105 MMOL/L (ref 98–110)
CHOLEST SERPL-MCNC: 229 MG/DL
CHOLEST/HDLC SERPL: 4.4 (CALC)
CO2 SERPL-SCNC: 25 MMOL/L (ref 20–32)
CREAT SERPL-MCNC: 1.01 MG/DL (ref 0.6–1.24)
EGFRCR SERPLBLD CKD-EPI 2021: 106 ML/MIN/1.73M2
EOSINOPHIL # BLD AUTO: 83 CELLS/UL (ref 15–500)
EOSINOPHIL NFR BLD AUTO: 1.5 %
ERYTHROCYTE [DISTWIDTH] IN BLOOD BY AUTOMATED COUNT: 14 % (ref 11–15)
GLUCOSE SERPL-MCNC: 82 MG/DL (ref 65–99)
HCT VFR BLD AUTO: 44.4 % (ref 38.5–50)
HDLC SERPL-MCNC: 52 MG/DL
HGB BLD-MCNC: 14.1 G/DL (ref 13.2–17.1)
LDLC SERPL CALC-MCNC: 151 MG/DL (CALC)
LYMPHOCYTES # BLD AUTO: 1755 CELLS/UL (ref 850–3900)
LYMPHOCYTES NFR BLD AUTO: 31.9 %
MCH RBC QN AUTO: 27.8 PG (ref 27–33)
MCHC RBC AUTO-ENTMCNC: 31.8 G/DL (ref 32–36)
MCV RBC AUTO: 87.4 FL (ref 80–100)
MONOCYTES # BLD AUTO: 341 CELLS/UL (ref 200–950)
MONOCYTES NFR BLD AUTO: 6.2 %
NEUTROPHILS # BLD AUTO: 3273 CELLS/UL (ref 1500–7800)
NEUTROPHILS NFR BLD AUTO: 59.5 %
NONHDLC SERPL-MCNC: 177 MG/DL (CALC)
PLATELET # BLD AUTO: 309 THOUSAND/UL (ref 140–400)
PMV BLD REES-ECKER: 10.8 FL (ref 7.5–12.5)
POTASSIUM SERPL-SCNC: 4.3 MMOL/L (ref 3.5–5.3)
PROT SERPL-MCNC: 7.7 G/DL (ref 6.1–8.1)
RBC # BLD AUTO: 5.08 MILLION/UL (ref 4.2–5.8)
SODIUM SERPL-SCNC: 139 MMOL/L (ref 135–146)
TRIGL SERPL-MCNC: 132 MG/DL
TSH SERPL-ACNC: 1.71 MIU/L (ref 0.4–4.5)
WBC # BLD AUTO: 5.5 THOUSAND/UL (ref 3.8–10.8)

## 2025-08-18 ENCOUNTER — TELEPHONE (OUTPATIENT)
Dept: PRIMARY CARE | Facility: CLINIC | Age: 25
End: 2025-08-18
Payer: COMMERCIAL

## 2025-08-18 DIAGNOSIS — F90.2 ATTENTION DEFICIT HYPERACTIVITY DISORDER (ADHD), COMBINED TYPE: ICD-10-CM

## 2025-08-18 RX ORDER — DEXTROAMPHETAMINE SACCHARATE, AMPHETAMINE ASPARTATE MONOHYDRATE, DEXTROAMPHETAMINE SULFATE, AMPHETAMINE SULFATE 9.375; 9.375; 9.375; 9.375 MG/1; MG/1; MG/1; MG/1
37.5 CAPSULE, EXTENDED RELEASE ORAL EVERY MORNING
Qty: 30 CAPSULE | Refills: 0 | Status: SHIPPED | OUTPATIENT
Start: 2025-08-19 | End: 2025-09-18

## 2025-08-20 DIAGNOSIS — F31.60 BIPOLAR DISORDER, MIXED (MULTI): ICD-10-CM

## 2025-08-20 RX ORDER — OLANZAPINE AND FLUOXETINE 6; 25 MG/1; MG/1
1 CAPSULE ORAL EVERY EVENING
Qty: 30 CAPSULE | Refills: 2 | Status: SHIPPED | OUTPATIENT
Start: 2025-08-20 | End: 2025-11-18

## 2025-08-22 DIAGNOSIS — E88.810 SYNDROME X, METABOLIC: ICD-10-CM

## 2025-08-23 RX ORDER — METFORMIN HYDROCHLORIDE 500 MG/1
TABLET, EXTENDED RELEASE ORAL
Qty: 90 TABLET | Refills: 1 | Status: SHIPPED | OUTPATIENT
Start: 2025-08-23

## 2025-08-26 ENCOUNTER — TELEPHONE (OUTPATIENT)
Dept: PRIMARY CARE | Facility: CLINIC | Age: 25
End: 2025-08-26
Payer: COMMERCIAL

## 2025-10-15 ENCOUNTER — APPOINTMENT (OUTPATIENT)
Dept: PRIMARY CARE | Facility: CLINIC | Age: 25
End: 2025-10-15
Payer: COMMERCIAL